# Patient Record
Sex: MALE | Race: AMERICAN INDIAN OR ALASKA NATIVE | NOT HISPANIC OR LATINO
[De-identification: names, ages, dates, MRNs, and addresses within clinical notes are randomized per-mention and may not be internally consistent; named-entity substitution may affect disease eponyms.]

---

## 2017-01-17 ENCOUNTER — RX RENEWAL (OUTPATIENT)
Age: 52
End: 2017-01-17

## 2017-01-18 ENCOUNTER — RX RENEWAL (OUTPATIENT)
Age: 52
End: 2017-01-18

## 2017-02-02 ENCOUNTER — APPOINTMENT (OUTPATIENT)
Dept: INFECTIOUS DISEASE | Facility: CLINIC | Age: 52
End: 2017-02-02

## 2017-02-13 ENCOUNTER — RX RENEWAL (OUTPATIENT)
Age: 52
End: 2017-02-13

## 2017-02-15 ENCOUNTER — MEDICATION RENEWAL (OUTPATIENT)
Age: 52
End: 2017-02-15

## 2017-02-15 ENCOUNTER — RX RENEWAL (OUTPATIENT)
Age: 52
End: 2017-02-15

## 2017-02-16 ENCOUNTER — RX RENEWAL (OUTPATIENT)
Age: 52
End: 2017-02-16

## 2017-03-08 ENCOUNTER — OUTPATIENT (OUTPATIENT)
Dept: OUTPATIENT SERVICES | Facility: HOSPITAL | Age: 52
LOS: 1 days | Discharge: HOME | End: 2017-03-08

## 2017-03-08 DIAGNOSIS — M79.605 PAIN IN LEFT LEG: ICD-10-CM

## 2017-03-09 ENCOUNTER — FORM ENCOUNTER (OUTPATIENT)
Age: 52
End: 2017-03-09

## 2017-03-10 ENCOUNTER — APPOINTMENT (OUTPATIENT)
Dept: RADIOLOGY | Facility: IMAGING CENTER | Age: 52
End: 2017-03-10

## 2017-03-10 ENCOUNTER — OUTPATIENT (OUTPATIENT)
Dept: OUTPATIENT SERVICES | Facility: HOSPITAL | Age: 52
LOS: 1 days | End: 2017-03-10
Payer: MEDICARE

## 2017-03-10 ENCOUNTER — APPOINTMENT (OUTPATIENT)
Dept: HEMATOLOGY ONCOLOGY | Facility: CLINIC | Age: 52
End: 2017-03-10

## 2017-03-10 VITALS
RESPIRATION RATE: 16 BRPM | SYSTOLIC BLOOD PRESSURE: 143 MMHG | DIASTOLIC BLOOD PRESSURE: 87 MMHG | WEIGHT: 287.7 LBS | OXYGEN SATURATION: 95 % | BODY MASS INDEX: 36.94 KG/M2 | TEMPERATURE: 97.7 F | HEART RATE: 100 BPM

## 2017-03-10 DIAGNOSIS — M79.2 NEURALGIA AND NEURITIS, UNSPECIFIED: ICD-10-CM

## 2017-03-10 PROCEDURE — 73610 X-RAY EXAM OF ANKLE: CPT

## 2017-04-11 ENCOUNTER — RX RENEWAL (OUTPATIENT)
Age: 52
End: 2017-04-11

## 2017-04-13 ENCOUNTER — MEDICATION RENEWAL (OUTPATIENT)
Age: 52
End: 2017-04-13

## 2017-05-10 ENCOUNTER — RX RENEWAL (OUTPATIENT)
Age: 52
End: 2017-05-10

## 2017-06-14 ENCOUNTER — RX RENEWAL (OUTPATIENT)
Age: 52
End: 2017-06-14

## 2017-07-11 ENCOUNTER — RX RENEWAL (OUTPATIENT)
Age: 52
End: 2017-07-11

## 2017-08-02 ENCOUNTER — OUTPATIENT (OUTPATIENT)
Dept: OUTPATIENT SERVICES | Facility: HOSPITAL | Age: 52
LOS: 1 days | Discharge: HOME | End: 2017-08-02

## 2017-08-02 DIAGNOSIS — M79.609 PAIN IN UNSPECIFIED LIMB: ICD-10-CM

## 2017-08-07 ENCOUNTER — APPOINTMENT (OUTPATIENT)
Dept: HEMATOLOGY ONCOLOGY | Facility: CLINIC | Age: 52
End: 2017-08-07

## 2017-08-07 ENCOUNTER — RX RENEWAL (OUTPATIENT)
Age: 52
End: 2017-08-07

## 2017-08-07 ENCOUNTER — APPOINTMENT (OUTPATIENT)
Dept: HEMATOLOGY ONCOLOGY | Facility: CLINIC | Age: 52
End: 2017-08-07
Payer: MEDICARE

## 2017-08-07 VITALS
DIASTOLIC BLOOD PRESSURE: 70 MMHG | WEIGHT: 284.4 LBS | RESPIRATION RATE: 16 BRPM | HEART RATE: 110 BPM | OXYGEN SATURATION: 99 % | TEMPERATURE: 98 F | BODY MASS INDEX: 36.51 KG/M2 | SYSTOLIC BLOOD PRESSURE: 140 MMHG

## 2017-08-07 DIAGNOSIS — J06.9 ACUTE UPPER RESPIRATORY INFECTION, UNSPECIFIED: ICD-10-CM

## 2017-08-07 DIAGNOSIS — F31.9 BIPOLAR DISORDER, UNSPECIFIED: ICD-10-CM

## 2017-08-07 PROCEDURE — 99215 OFFICE O/P EST HI 40 MIN: CPT

## 2017-08-08 ENCOUNTER — RX RENEWAL (OUTPATIENT)
Age: 52
End: 2017-08-08

## 2017-09-05 ENCOUNTER — MEDICATION RENEWAL (OUTPATIENT)
Age: 52
End: 2017-09-05

## 2017-09-07 ENCOUNTER — RX RENEWAL (OUTPATIENT)
Age: 52
End: 2017-09-07

## 2017-11-02 ENCOUNTER — RX RENEWAL (OUTPATIENT)
Age: 52
End: 2017-11-02

## 2017-11-09 ENCOUNTER — RX RENEWAL (OUTPATIENT)
Age: 52
End: 2017-11-09

## 2017-11-17 ENCOUNTER — OUTPATIENT (OUTPATIENT)
Dept: OUTPATIENT SERVICES | Facility: HOSPITAL | Age: 52
LOS: 1 days | Discharge: HOME | End: 2017-11-17

## 2017-11-17 DIAGNOSIS — M79.609 PAIN IN UNSPECIFIED LIMB: ICD-10-CM

## 2017-12-01 ENCOUNTER — OUTPATIENT (OUTPATIENT)
Dept: OUTPATIENT SERVICES | Facility: HOSPITAL | Age: 52
LOS: 1 days | End: 2017-12-01
Payer: MEDICAID

## 2017-12-01 PROCEDURE — G9001: CPT

## 2017-12-06 ENCOUNTER — RX RENEWAL (OUTPATIENT)
Age: 52
End: 2017-12-06

## 2017-12-13 ENCOUNTER — EMERGENCY (EMERGENCY)
Facility: HOSPITAL | Age: 52
LOS: 1 days | Discharge: HOME | End: 2017-12-13
Attending: EMERGENCY MEDICINE | Admitting: EMERGENCY MEDICINE
Payer: MEDICARE

## 2017-12-13 VITALS
SYSTOLIC BLOOD PRESSURE: 153 MMHG | RESPIRATION RATE: 16 BRPM | TEMPERATURE: 98 F | WEIGHT: 281.97 LBS | DIASTOLIC BLOOD PRESSURE: 88 MMHG | HEART RATE: 100 BPM | OXYGEN SATURATION: 94 % | HEIGHT: 74 IN

## 2017-12-13 VITALS
HEART RATE: 80 BPM | DIASTOLIC BLOOD PRESSURE: 87 MMHG | RESPIRATION RATE: 16 BRPM | OXYGEN SATURATION: 98 % | SYSTOLIC BLOOD PRESSURE: 130 MMHG | TEMPERATURE: 98 F

## 2017-12-13 LAB
ALBUMIN SERPL ELPH-MCNC: 3.2 G/DL — LOW (ref 3.3–5)
ALP SERPL-CCNC: 42 U/L — SIGNIFICANT CHANGE UP (ref 40–120)
ALT FLD-CCNC: 17 U/L — SIGNIFICANT CHANGE UP (ref 12–78)
ANION GAP SERPL CALC-SCNC: 7 MMOL/L — SIGNIFICANT CHANGE UP (ref 5–17)
APTT BLD: 34.4 SEC — SIGNIFICANT CHANGE UP (ref 27.5–37.4)
AST SERPL-CCNC: 25 U/L — SIGNIFICANT CHANGE UP (ref 15–37)
BASOPHILS # BLD AUTO: 0.1 K/UL — SIGNIFICANT CHANGE UP (ref 0–0.2)
BASOPHILS NFR BLD AUTO: 1.7 % — SIGNIFICANT CHANGE UP (ref 0–2)
BILIRUB SERPL-MCNC: 0.3 MG/DL — SIGNIFICANT CHANGE UP (ref 0.2–1.2)
BUN SERPL-MCNC: 15 MG/DL — SIGNIFICANT CHANGE UP (ref 7–23)
CALCIUM SERPL-MCNC: 8.3 MG/DL — LOW (ref 8.5–10.1)
CHLORIDE SERPL-SCNC: 105 MMOL/L — SIGNIFICANT CHANGE UP (ref 96–108)
CK MB BLD-MCNC: 2.1 % — SIGNIFICANT CHANGE UP (ref 0–3.5)
CK MB CFR SERPL CALC: 1.3 NG/ML — SIGNIFICANT CHANGE UP (ref 0–3.6)
CK SERPL-CCNC: 63 U/L — SIGNIFICANT CHANGE UP (ref 26–308)
CO2 SERPL-SCNC: 26 MMOL/L — SIGNIFICANT CHANGE UP (ref 22–31)
CREAT SERPL-MCNC: 1.3 MG/DL — SIGNIFICANT CHANGE UP (ref 0.5–1.3)
EOSINOPHIL # BLD AUTO: 0.2 K/UL — SIGNIFICANT CHANGE UP (ref 0–0.5)
EOSINOPHIL NFR BLD AUTO: 3.5 % — SIGNIFICANT CHANGE UP (ref 0–6)
GLUCOSE SERPL-MCNC: 172 MG/DL — HIGH (ref 70–99)
HCT VFR BLD CALC: 43.4 % — SIGNIFICANT CHANGE UP (ref 39–50)
HGB BLD-MCNC: 12.8 G/DL — LOW (ref 13–17)
INR BLD: 1.07 RATIO — SIGNIFICANT CHANGE UP (ref 0.88–1.16)
LACTATE SERPL-SCNC: 1.3 MMOL/L — SIGNIFICANT CHANGE UP (ref 0.7–2)
LYMPHOCYTES # BLD AUTO: 2 K/UL — SIGNIFICANT CHANGE UP (ref 1–3.3)
LYMPHOCYTES # BLD AUTO: 34 % — SIGNIFICANT CHANGE UP (ref 13–44)
MCHC RBC-ENTMCNC: 23.1 PG — LOW (ref 27–34)
MCHC RBC-ENTMCNC: 29.6 GM/DL — LOW (ref 32–36)
MCV RBC AUTO: 78 FL — LOW (ref 80–100)
MONOCYTES # BLD AUTO: 0.8 K/UL — SIGNIFICANT CHANGE UP (ref 0–0.9)
MONOCYTES NFR BLD AUTO: 13.1 % — HIGH (ref 1–9)
NEUTROPHILS # BLD AUTO: 2.8 K/UL — SIGNIFICANT CHANGE UP (ref 1.8–7.4)
NEUTROPHILS NFR BLD AUTO: 47.7 % — SIGNIFICANT CHANGE UP (ref 43–77)
PLATELET # BLD AUTO: 306 K/UL — SIGNIFICANT CHANGE UP (ref 150–400)
POTASSIUM SERPL-MCNC: 3.9 MMOL/L — SIGNIFICANT CHANGE UP (ref 3.5–5.3)
POTASSIUM SERPL-SCNC: 3.9 MMOL/L — SIGNIFICANT CHANGE UP (ref 3.5–5.3)
PROT SERPL-MCNC: 7.7 G/DL — SIGNIFICANT CHANGE UP (ref 6–8.3)
PROTHROM AB SERPL-ACNC: 11.7 SEC — SIGNIFICANT CHANGE UP (ref 9.8–12.7)
RBC # BLD: 5.56 M/UL — SIGNIFICANT CHANGE UP (ref 4.2–5.8)
RBC # FLD: 18.1 % — HIGH (ref 10.3–14.5)
SODIUM SERPL-SCNC: 138 MMOL/L — SIGNIFICANT CHANGE UP (ref 135–145)
TROPONIN I SERPL-MCNC: <.015 NG/ML — SIGNIFICANT CHANGE UP (ref 0.01–0.04)
WBC # BLD: 5.8 K/UL — SIGNIFICANT CHANGE UP (ref 3.8–10.5)
WBC # FLD AUTO: 5.8 K/UL — SIGNIFICANT CHANGE UP (ref 3.8–10.5)

## 2017-12-13 PROCEDURE — 82553 CREATINE MB FRACTION: CPT

## 2017-12-13 PROCEDURE — 99284 EMERGENCY DEPT VISIT MOD MDM: CPT | Mod: 25

## 2017-12-13 PROCEDURE — 99285 EMERGENCY DEPT VISIT HI MDM: CPT

## 2017-12-13 PROCEDURE — 84484 ASSAY OF TROPONIN QUANT: CPT

## 2017-12-13 PROCEDURE — 71260 CT THORAX DX C+: CPT

## 2017-12-13 PROCEDURE — 82550 ASSAY OF CK (CPK): CPT

## 2017-12-13 PROCEDURE — 83605 ASSAY OF LACTIC ACID: CPT

## 2017-12-13 PROCEDURE — 80053 COMPREHEN METABOLIC PANEL: CPT

## 2017-12-13 PROCEDURE — 93005 ELECTROCARDIOGRAM TRACING: CPT

## 2017-12-13 PROCEDURE — 85610 PROTHROMBIN TIME: CPT

## 2017-12-13 PROCEDURE — 93010 ELECTROCARDIOGRAM REPORT: CPT

## 2017-12-13 PROCEDURE — 85730 THROMBOPLASTIN TIME PARTIAL: CPT

## 2017-12-13 PROCEDURE — 96365 THER/PROPH/DIAG IV INF INIT: CPT | Mod: XU

## 2017-12-13 PROCEDURE — 71020: CPT | Mod: 26

## 2017-12-13 PROCEDURE — 71046 X-RAY EXAM CHEST 2 VIEWS: CPT

## 2017-12-13 PROCEDURE — 71260 CT THORAX DX C+: CPT | Mod: 26

## 2017-12-13 PROCEDURE — 87040 BLOOD CULTURE FOR BACTERIA: CPT

## 2017-12-13 PROCEDURE — 85027 COMPLETE CBC AUTOMATED: CPT

## 2017-12-13 RX ORDER — OXYCODONE HYDROCHLORIDE 5 MG/1
0 TABLET ORAL
Qty: 0 | Refills: 0 | COMMUNITY

## 2017-12-13 RX ORDER — SODIUM CHLORIDE 9 MG/ML
3 INJECTION INTRAMUSCULAR; INTRAVENOUS; SUBCUTANEOUS ONCE
Qty: 0 | Refills: 0 | Status: COMPLETED | OUTPATIENT
Start: 2017-12-13 | End: 2017-12-13

## 2017-12-13 RX ORDER — METFORMIN HYDROCHLORIDE 850 MG/1
0 TABLET ORAL
Qty: 0 | Refills: 0 | COMMUNITY

## 2017-12-13 RX ORDER — TAMSULOSIN HYDROCHLORIDE 0.4 MG/1
1 CAPSULE ORAL
Qty: 0 | Refills: 0 | COMMUNITY

## 2017-12-13 RX ORDER — OMEGA-3 ACID ETHYL ESTERS 1 G
2 CAPSULE ORAL
Qty: 0 | Refills: 0 | COMMUNITY

## 2017-12-13 RX ORDER — SODIUM CHLORIDE 9 MG/ML
1000 INJECTION INTRAMUSCULAR; INTRAVENOUS; SUBCUTANEOUS ONCE
Qty: 0 | Refills: 0 | Status: COMPLETED | OUTPATIENT
Start: 2017-12-13 | End: 2017-12-13

## 2017-12-13 RX ORDER — PROPRANOLOL HCL 160 MG
0 CAPSULE, EXTENDED RELEASE 24HR ORAL
Qty: 0 | Refills: 0 | COMMUNITY

## 2017-12-13 RX ORDER — AZITHROMYCIN 500 MG/1
500 TABLET, FILM COATED ORAL ONCE
Qty: 0 | Refills: 0 | Status: DISCONTINUED | OUTPATIENT
Start: 2017-12-13 | End: 2017-12-17

## 2017-12-13 RX ORDER — ALPRAZOLAM 0.25 MG
0 TABLET ORAL
Qty: 0 | Refills: 0 | COMMUNITY

## 2017-12-13 RX ORDER — METHADONE HYDROCHLORIDE 40 MG/1
0 TABLET ORAL
Qty: 0 | Refills: 0 | COMMUNITY

## 2017-12-13 RX ORDER — FLUOXETINE HCL 10 MG
0 CAPSULE ORAL
Qty: 0 | Refills: 0 | COMMUNITY

## 2017-12-13 RX ORDER — ATORVASTATIN CALCIUM 80 MG/1
0 TABLET, FILM COATED ORAL
Qty: 0 | Refills: 0 | COMMUNITY

## 2017-12-13 RX ORDER — LIRAGLUTIDE 6 MG/ML
0 INJECTION SUBCUTANEOUS
Qty: 0 | Refills: 0 | COMMUNITY

## 2017-12-13 RX ORDER — CEFUROXIME AXETIL 250 MG
1 TABLET ORAL
Qty: 14 | Refills: 0 | OUTPATIENT
Start: 2017-12-13 | End: 2017-12-19

## 2017-12-13 RX ORDER — AZITHROMYCIN 500 MG/1
1 TABLET, FILM COATED ORAL
Qty: 4 | Refills: 0 | OUTPATIENT
Start: 2017-12-13 | End: 2017-12-16

## 2017-12-13 RX ORDER — ESOMEPRAZOLE MAGNESIUM 40 MG/1
0 CAPSULE, DELAYED RELEASE ORAL
Qty: 0 | Refills: 0 | COMMUNITY

## 2017-12-13 RX ORDER — DEXTROAMPHETAMINE SACCHARATE, AMPHETAMINE ASPARTATE, DEXTROAMPHETAMINE SULFATE AND AMPHETAMINE SULFATE 1.875; 1.875; 1.875; 1.875 MG/1; MG/1; MG/1; MG/1
0 TABLET ORAL
Qty: 0 | Refills: 0 | COMMUNITY

## 2017-12-13 RX ORDER — CEFTRIAXONE 500 MG/1
1 INJECTION, POWDER, FOR SOLUTION INTRAMUSCULAR; INTRAVENOUS ONCE
Qty: 0 | Refills: 0 | Status: COMPLETED | OUTPATIENT
Start: 2017-12-13 | End: 2017-12-13

## 2017-12-13 RX ADMIN — SODIUM CHLORIDE 3 MILLILITER(S): 9 INJECTION INTRAMUSCULAR; INTRAVENOUS; SUBCUTANEOUS at 14:45

## 2017-12-13 RX ADMIN — SODIUM CHLORIDE 1000 MILLILITER(S): 9 INJECTION INTRAMUSCULAR; INTRAVENOUS; SUBCUTANEOUS at 15:01

## 2017-12-13 RX ADMIN — CEFTRIAXONE 100 GRAM(S): 500 INJECTION, POWDER, FOR SOLUTION INTRAMUSCULAR; INTRAVENOUS at 15:46

## 2017-12-13 NOTE — ED ADULT TRIAGE NOTE - CHIEF COMPLAINT QUOTE
pt sent by dr [facility] for cough, congestion, shortness of breath dizziness. pt also stating left sided CP

## 2017-12-13 NOTE — ED ADULT NURSE NOTE - PMH
DM (diabetes mellitus)    HIV (human immunodeficiency virus infection)    Neuropathy    PNA (pneumonia)    Sleep apnea

## 2017-12-13 NOTE — ED PROVIDER NOTE - PHYSICAL EXAMINATION
Gen:  alert, awake, no acute distress  HEENT:  atraumatic head, airway clear, pupils equal and round  CV:  rrr, nl S1, S2, no m/r/g  Pulm:  BS equal b/l  Abd: s/nt/nd, +BS  Ext:  moving all extremities  Neuro:  grossly intact, no deficits  Skin:  clear, dry, intact

## 2017-12-13 NOTE — ED PROVIDER NOTE - OBJECTIVE STATEMENT
cough, congestion, cp and sob for 2-3 days, seen at urgent care and sent to ER for "abnormal" cxr and ekg.  pt w h/o afib in the past.  cp described and sharp, intermittent, no pain now. pt denies fever, + non-productive cough

## 2017-12-13 NOTE — ED ADULT NURSE NOTE - OBJECTIVE STATEMENT
pt to er c/o chest discomfort pain radiating to back is alert oriented speech clear lungs clear resp even unlabored iv started 20 angio left hand labs drawn blood cultures drawn

## 2017-12-14 ENCOUNTER — APPOINTMENT (OUTPATIENT)
Dept: HEMATOLOGY ONCOLOGY | Facility: CLINIC | Age: 52
End: 2017-12-14

## 2017-12-15 DIAGNOSIS — R69 ILLNESS, UNSPECIFIED: ICD-10-CM

## 2017-12-18 LAB
CULTURE RESULTS: SIGNIFICANT CHANGE UP
CULTURE RESULTS: SIGNIFICANT CHANGE UP
SPECIMEN SOURCE: SIGNIFICANT CHANGE UP
SPECIMEN SOURCE: SIGNIFICANT CHANGE UP

## 2018-01-03 ENCOUNTER — RX RENEWAL (OUTPATIENT)
Age: 53
End: 2018-01-03

## 2018-01-09 ENCOUNTER — OUTPATIENT (OUTPATIENT)
Dept: OUTPATIENT SERVICES | Facility: HOSPITAL | Age: 53
LOS: 1 days | Discharge: HOME | End: 2018-01-09

## 2018-01-09 DIAGNOSIS — M79.609 PAIN IN UNSPECIFIED LIMB: ICD-10-CM

## 2018-01-22 ENCOUNTER — APPOINTMENT (OUTPATIENT)
Dept: HEMATOLOGY ONCOLOGY | Facility: CLINIC | Age: 53
End: 2018-01-22
Payer: MEDICARE

## 2018-01-22 VITALS
TEMPERATURE: 98.9 F | BODY MASS INDEX: 34.82 KG/M2 | WEIGHT: 271.17 LBS | DIASTOLIC BLOOD PRESSURE: 93 MMHG | HEART RATE: 86 BPM | OXYGEN SATURATION: 94 % | RESPIRATION RATE: 16 BRPM | SYSTOLIC BLOOD PRESSURE: 153 MMHG

## 2018-01-22 DIAGNOSIS — R19.7 DIARRHEA, UNSPECIFIED: ICD-10-CM

## 2018-01-22 PROCEDURE — 99215 OFFICE O/P EST HI 40 MIN: CPT

## 2018-01-26 ENCOUNTER — RX RENEWAL (OUTPATIENT)
Age: 53
End: 2018-01-26

## 2018-02-01 ENCOUNTER — RX RENEWAL (OUTPATIENT)
Age: 53
End: 2018-02-01

## 2018-02-27 ENCOUNTER — RX RENEWAL (OUTPATIENT)
Age: 53
End: 2018-02-27

## 2018-03-01 ENCOUNTER — RX RENEWAL (OUTPATIENT)
Age: 53
End: 2018-03-01

## 2018-03-20 ENCOUNTER — OUTPATIENT (OUTPATIENT)
Dept: OUTPATIENT SERVICES | Facility: HOSPITAL | Age: 53
LOS: 1 days | Discharge: HOME | End: 2018-03-20

## 2018-03-20 DIAGNOSIS — M79.609 PAIN IN UNSPECIFIED LIMB: ICD-10-CM

## 2018-03-21 ENCOUNTER — RX RENEWAL (OUTPATIENT)
Age: 53
End: 2018-03-21

## 2018-04-01 ENCOUNTER — OUTPATIENT (OUTPATIENT)
Dept: OUTPATIENT SERVICES | Facility: HOSPITAL | Age: 53
LOS: 1 days | End: 2018-04-01
Payer: MEDICAID

## 2018-04-01 PROCEDURE — G9001: CPT

## 2018-04-05 DIAGNOSIS — R69 ILLNESS, UNSPECIFIED: ICD-10-CM

## 2018-04-06 NOTE — ED PROVIDER NOTE - PROGRESS NOTE DETAILS
Pt doing well, no acute co or changes. Pt on HIV meds, last test undetectable.   Dw pt re labs /.XR / CT findings, need for close, prompt fu. Also discussed chronic kidney findings, no acute co / urinary sx / l back / flank pain although he has had in past. He will fu with urology asap.  WIll rx with abx. Dw them re infxn prec / inst and importance of close, prompt outpt fu
Applied

## 2018-04-25 ENCOUNTER — RX RENEWAL (OUTPATIENT)
Age: 53
End: 2018-04-25

## 2018-05-15 ENCOUNTER — OUTPATIENT (OUTPATIENT)
Dept: OUTPATIENT SERVICES | Facility: HOSPITAL | Age: 53
LOS: 1 days | Discharge: HOME | End: 2018-05-15

## 2018-05-15 DIAGNOSIS — M79.609 PAIN IN UNSPECIFIED LIMB: ICD-10-CM

## 2018-05-17 ENCOUNTER — APPOINTMENT (OUTPATIENT)
Dept: HEMATOLOGY ONCOLOGY | Facility: CLINIC | Age: 53
End: 2018-05-17
Payer: MEDICAID

## 2018-05-17 VITALS
BODY MASS INDEX: 34.82 KG/M2 | OXYGEN SATURATION: 94 % | HEART RATE: 127 BPM | RESPIRATION RATE: 18 BRPM | DIASTOLIC BLOOD PRESSURE: 80 MMHG | TEMPERATURE: 98.8 F | SYSTOLIC BLOOD PRESSURE: 142 MMHG | WEIGHT: 271.17 LBS

## 2018-05-17 PROCEDURE — 99214 OFFICE O/P EST MOD 30 MIN: CPT

## 2018-05-23 ENCOUNTER — RX RENEWAL (OUTPATIENT)
Age: 53
End: 2018-05-23

## 2018-06-18 ENCOUNTER — RX RENEWAL (OUTPATIENT)
Age: 53
End: 2018-06-18

## 2018-06-25 ENCOUNTER — OUTPATIENT (OUTPATIENT)
Dept: OUTPATIENT SERVICES | Facility: HOSPITAL | Age: 53
LOS: 1 days | Discharge: HOME | End: 2018-06-25

## 2018-06-25 DIAGNOSIS — M79.609 PAIN IN UNSPECIFIED LIMB: ICD-10-CM

## 2018-07-05 ENCOUNTER — APPOINTMENT (OUTPATIENT)
Dept: HEMATOLOGY ONCOLOGY | Facility: CLINIC | Age: 53
End: 2018-07-05
Payer: MEDICARE

## 2018-07-05 VITALS
BODY MASS INDEX: 35.72 KG/M2 | RESPIRATION RATE: 18 BRPM | TEMPERATURE: 98.7 F | HEART RATE: 94 BPM | SYSTOLIC BLOOD PRESSURE: 146 MMHG | WEIGHT: 278.22 LBS | DIASTOLIC BLOOD PRESSURE: 79 MMHG | OXYGEN SATURATION: 94 %

## 2018-07-05 PROCEDURE — 99214 OFFICE O/P EST MOD 30 MIN: CPT

## 2018-07-17 ENCOUNTER — RX RENEWAL (OUTPATIENT)
Age: 53
End: 2018-07-17

## 2018-08-14 ENCOUNTER — RX RENEWAL (OUTPATIENT)
Age: 53
End: 2018-08-14

## 2018-08-15 ENCOUNTER — RX RENEWAL (OUTPATIENT)
Age: 53
End: 2018-08-15

## 2018-08-17 ENCOUNTER — MEDICATION RENEWAL (OUTPATIENT)
Age: 53
End: 2018-08-17

## 2018-09-04 PROBLEM — G62.9 POLYNEUROPATHY, UNSPECIFIED: Chronic | Status: ACTIVE | Noted: 2017-12-13

## 2018-09-04 PROBLEM — G47.30 SLEEP APNEA, UNSPECIFIED: Chronic | Status: ACTIVE | Noted: 2017-12-13

## 2018-09-04 PROBLEM — J18.9 PNEUMONIA, UNSPECIFIED ORGANISM: Chronic | Status: ACTIVE | Noted: 2017-12-13

## 2018-09-04 PROBLEM — B20 HUMAN IMMUNODEFICIENCY VIRUS [HIV] DISEASE: Chronic | Status: ACTIVE | Noted: 2017-12-13

## 2018-09-04 PROBLEM — E11.9 TYPE 2 DIABETES MELLITUS WITHOUT COMPLICATIONS: Chronic | Status: ACTIVE | Noted: 2017-12-13

## 2018-09-05 ENCOUNTER — OUTPATIENT (OUTPATIENT)
Dept: OUTPATIENT SERVICES | Facility: HOSPITAL | Age: 53
LOS: 1 days | Discharge: HOME | End: 2018-09-05

## 2018-09-05 DIAGNOSIS — M79.606 PAIN IN LEG, UNSPECIFIED: ICD-10-CM

## 2018-09-10 ENCOUNTER — LABORATORY RESULT (OUTPATIENT)
Age: 53
End: 2018-09-10

## 2018-09-10 ENCOUNTER — APPOINTMENT (OUTPATIENT)
Dept: HEMATOLOGY ONCOLOGY | Facility: CLINIC | Age: 53
End: 2018-09-10
Payer: MEDICARE

## 2018-09-10 VITALS
DIASTOLIC BLOOD PRESSURE: 86 MMHG | RESPIRATION RATE: 18 BRPM | SYSTOLIC BLOOD PRESSURE: 120 MMHG | OXYGEN SATURATION: 99 % | HEART RATE: 122 BPM | WEIGHT: 274.48 LBS | TEMPERATURE: 98.1 F | BODY MASS INDEX: 35.24 KG/M2

## 2018-09-10 PROCEDURE — 99214 OFFICE O/P EST MOD 30 MIN: CPT

## 2018-10-11 ENCOUNTER — RX RENEWAL (OUTPATIENT)
Age: 53
End: 2018-10-11

## 2018-11-06 ENCOUNTER — RX RENEWAL (OUTPATIENT)
Age: 53
End: 2018-11-06

## 2018-12-04 ENCOUNTER — RX RENEWAL (OUTPATIENT)
Age: 53
End: 2018-12-04

## 2018-12-16 ENCOUNTER — OUTPATIENT (OUTPATIENT)
Dept: OUTPATIENT SERVICES | Facility: HOSPITAL | Age: 53
LOS: 1 days | Discharge: HOME | End: 2018-12-16

## 2018-12-16 DIAGNOSIS — M79.609 PAIN IN UNSPECIFIED LIMB: ICD-10-CM

## 2018-12-27 ENCOUNTER — APPOINTMENT (OUTPATIENT)
Dept: HEMATOLOGY ONCOLOGY | Facility: CLINIC | Age: 53
End: 2018-12-27
Payer: MEDICARE

## 2018-12-27 VITALS
HEART RATE: 93 BPM | RESPIRATION RATE: 16 BRPM | WEIGHT: 262 LBS | BODY MASS INDEX: 33.64 KG/M2 | SYSTOLIC BLOOD PRESSURE: 167 MMHG | TEMPERATURE: 98.4 F | DIASTOLIC BLOOD PRESSURE: 70 MMHG | OXYGEN SATURATION: 94 %

## 2018-12-27 PROCEDURE — 99215 OFFICE O/P EST HI 40 MIN: CPT

## 2018-12-31 NOTE — HISTORY OF PRESENT ILLNESS
[de-identified] : Compa is a 50-year-old man with a history of HIV disease currently under control with antiviral therapy. He is been seen here for many years with regards to management of neuropathic pain which he suffers from moderate ongoing basis. The patient has been on significant amounts of opiate-based therapies over the years and had been on higher doses of methadone to help control the neuropathic pain. However, as a result of sleep apnea issues, this is been considerably lowered from a high of approximately 150 mg down to 40 mg. This is resulted in improvement in his symptoms as well as CPAP therapy at home.the patient is also a diabetic, and in addition,. the patient uses high doses of oxycodone as well to control pain in his legs feet and lower back. He also suffers from severe depression and last year it was essentially in his bedroom from January until June as a result of a breakup with his partner. The patient is [place]  and has had trouble with sustaining relationships over the years.  [de-identified] : ablation for fib/ flutter sept 25, had fallen several times,feeling much better now.More motivated to cont a weaning process re his opioid dependency issues.

## 2018-12-31 NOTE — REVIEW OF SYSTEMS
[Joint Pain] : joint pain [Joint Stiffness] : joint stiffness [Muscle Pain] : muscle pain [Muscle Weakness] : muscle weakness [Anxiety] : anxiety [Depression] : depression [Negative] : Allergic/Immunologic

## 2018-12-31 NOTE — ASSESSMENT
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 and polypharmacy issues.Psychiatric overlays as prev documented.Recent PAF w abaltion.Additional weaning at 30 more pills per month beginning next Rx cycle.Pt agrees [Supportive] : Goals of care discussed with patient: Supportive

## 2019-01-02 ENCOUNTER — RX RENEWAL (OUTPATIENT)
Age: 54
End: 2019-01-02

## 2019-01-28 ENCOUNTER — RX RENEWAL (OUTPATIENT)
Age: 54
End: 2019-01-28

## 2019-02-27 ENCOUNTER — RX RENEWAL (OUTPATIENT)
Age: 54
End: 2019-02-27

## 2019-04-25 ENCOUNTER — RX RENEWAL (OUTPATIENT)
Age: 54
End: 2019-04-25

## 2019-05-07 ENCOUNTER — OUTPATIENT (OUTPATIENT)
Dept: OUTPATIENT SERVICES | Facility: HOSPITAL | Age: 54
LOS: 1 days | Discharge: HOME | End: 2019-05-07

## 2019-05-07 DIAGNOSIS — M79.609 PAIN IN UNSPECIFIED LIMB: ICD-10-CM

## 2019-05-09 ENCOUNTER — APPOINTMENT (OUTPATIENT)
Dept: HEMATOLOGY ONCOLOGY | Facility: CLINIC | Age: 54
End: 2019-05-09
Payer: MEDICARE

## 2019-05-09 VITALS
WEIGHT: 261.01 LBS | RESPIRATION RATE: 16 BRPM | BODY MASS INDEX: 33.51 KG/M2 | HEART RATE: 82 BPM | SYSTOLIC BLOOD PRESSURE: 136 MMHG | DIASTOLIC BLOOD PRESSURE: 80 MMHG | OXYGEN SATURATION: 93 % | TEMPERATURE: 98.2 F

## 2019-05-09 PROCEDURE — 99214 OFFICE O/P EST MOD 30 MIN: CPT

## 2019-05-13 NOTE — ASSESSMENT
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 and polypharmacy issues.Psychiatric overlays as prev documented. Additional weaning at 30 more pills per month beginning next Rx cycle.Pt agrees [Supportive] : Goals of care discussed with patient: Supportive

## 2019-05-13 NOTE — REVIEW OF SYSTEMS
[Joint Pain] : joint pain [Joint Stiffness] : joint stiffness [Muscle Weakness] : muscle weakness [Muscle Pain] : muscle pain [Anxiety] : anxiety [Depression] : depression [Negative] : Allergic/Immunologic

## 2019-05-13 NOTE — HISTORY OF PRESENT ILLNESS
[de-identified] : Compa is a 50-year-old man with a history of HIV disease currently under control with antiviral therapy. He is been seen here for many years with regards to management of neuropathic pain which he suffers from moderate ongoing basis. The patient has been on significant amounts of opiate-based therapies over the years and had been on higher doses of methadone to help control the neuropathic pain. However, as a result of sleep apnea issues, this is been considerably lowered from a high of approximately 150 mg down to 40 mg. This is resulted in improvement in his symptoms as well as CPAP therapy at home.the patient is also a diabetic, and in addition,. the patient uses high doses of oxycodone as well to control pain in his legs feet and lower back. He also suffers from severe depression and last year it was essentially in his bedroom from January until June as a result of a breakup with his partner. The patient is [place]  and has had trouble with sustaining relationships over the years.  [de-identified] : Ongoing dialogue re slow wean.Had been rx'd a higher abdifatah this month w expectation pt would conserve them.He states he had more pain and used "extra".This was deemed as unacceptable and he was counseled.This month to make additional effort at saving 1 pill per day and returning w saved pills in a pill bottle.Pt agrees.

## 2019-05-22 ENCOUNTER — RX RENEWAL (OUTPATIENT)
Age: 54
End: 2019-05-22

## 2019-06-19 ENCOUNTER — RX RENEWAL (OUTPATIENT)
Age: 54
End: 2019-06-19

## 2019-07-18 ENCOUNTER — RX RENEWAL (OUTPATIENT)
Age: 54
End: 2019-07-18

## 2019-08-09 ENCOUNTER — RX RENEWAL (OUTPATIENT)
Age: 54
End: 2019-08-09

## 2019-08-22 ENCOUNTER — OUTPATIENT (OUTPATIENT)
Dept: OUTPATIENT SERVICES | Facility: HOSPITAL | Age: 54
LOS: 1 days | Discharge: HOME | End: 2019-08-22

## 2019-08-22 DIAGNOSIS — M79.609 PAIN IN UNSPECIFIED LIMB: ICD-10-CM

## 2019-08-26 ENCOUNTER — APPOINTMENT (OUTPATIENT)
Dept: HEMATOLOGY ONCOLOGY | Facility: CLINIC | Age: 54
End: 2019-08-26
Payer: MEDICARE

## 2019-08-26 VITALS
BODY MASS INDEX: 30 KG/M2 | OXYGEN SATURATION: 99 % | HEART RATE: 87 BPM | SYSTOLIC BLOOD PRESSURE: 153 MMHG | DIASTOLIC BLOOD PRESSURE: 93 MMHG | RESPIRATION RATE: 14 BRPM | WEIGHT: 233.69 LBS | TEMPERATURE: 98.7 F

## 2019-08-26 PROCEDURE — 99215 OFFICE O/P EST HI 40 MIN: CPT

## 2019-09-12 ENCOUNTER — RX RENEWAL (OUTPATIENT)
Age: 54
End: 2019-09-12

## 2019-09-13 ENCOUNTER — RX RENEWAL (OUTPATIENT)
Age: 54
End: 2019-09-13

## 2019-10-07 ENCOUNTER — RX RENEWAL (OUTPATIENT)
Age: 54
End: 2019-10-07

## 2019-11-06 ENCOUNTER — RX RENEWAL (OUTPATIENT)
Age: 54
End: 2019-11-06

## 2019-11-29 ENCOUNTER — OUTPATIENT (OUTPATIENT)
Dept: OUTPATIENT SERVICES | Facility: HOSPITAL | Age: 54
LOS: 1 days | Discharge: HOME | End: 2019-11-29

## 2019-11-29 DIAGNOSIS — M79.609 PAIN IN UNSPECIFIED LIMB: ICD-10-CM

## 2019-12-05 ENCOUNTER — RX RENEWAL (OUTPATIENT)
Age: 54
End: 2019-12-05

## 2019-12-05 RX ORDER — AMOXICILLIN 500 MG/1
500 CAPSULE ORAL 3 TIMES DAILY
Qty: 30 | Refills: 0 | Status: DISCONTINUED | COMMUNITY
Start: 2017-08-07 | End: 2019-12-05

## 2019-12-13 ENCOUNTER — RESULT REVIEW (OUTPATIENT)
Age: 54
End: 2019-12-13

## 2019-12-13 ENCOUNTER — APPOINTMENT (OUTPATIENT)
Dept: HEMATOLOGY ONCOLOGY | Facility: CLINIC | Age: 54
End: 2019-12-13
Payer: MEDICARE

## 2019-12-13 VITALS
OXYGEN SATURATION: 100 % | RESPIRATION RATE: 16 BRPM | TEMPERATURE: 98.5 F | WEIGHT: 262.35 LBS | HEART RATE: 78 BPM | BODY MASS INDEX: 33.68 KG/M2 | SYSTOLIC BLOOD PRESSURE: 141 MMHG | DIASTOLIC BLOOD PRESSURE: 85 MMHG

## 2019-12-13 LAB
HCT VFR BLD CALC: 46 % — SIGNIFICANT CHANGE UP (ref 39–50)
HGB BLD-MCNC: 14.9 G/DL — SIGNIFICANT CHANGE UP (ref 13–17)
MCHC RBC-ENTMCNC: 29.5 PG — SIGNIFICANT CHANGE UP (ref 27–34)
MCHC RBC-ENTMCNC: 32.4 G/DL — SIGNIFICANT CHANGE UP (ref 32–36)
MCV RBC AUTO: 91.2 FL — SIGNIFICANT CHANGE UP (ref 80–100)
PLATELET # BLD AUTO: 259 K/UL — SIGNIFICANT CHANGE UP (ref 150–400)
RBC # BLD: 5.04 M/UL — SIGNIFICANT CHANGE UP (ref 4.2–5.8)
RBC # FLD: 13.6 % — SIGNIFICANT CHANGE UP (ref 10.3–14.5)
WBC # BLD: 8.3 K/UL — SIGNIFICANT CHANGE UP (ref 3.8–10.5)
WBC # FLD AUTO: 8.3 K/UL — SIGNIFICANT CHANGE UP (ref 3.8–10.5)

## 2019-12-13 PROCEDURE — 99215 OFFICE O/P EST HI 40 MIN: CPT

## 2019-12-14 NOTE — ASSESSMENT
[Supportive] : Goals of care discussed with patient: Supportive [FreeTextEntry1] : Continued issues w persistent complex neuropathic pain syndrome assoc w opioid dep. and polypharmacy issues.Psychiatric overlays as prev documented. Additional weaning  planned..has cut his use by 20pct since starting ([phone]).CBC done today shows HCT of 46

## 2019-12-14 NOTE — REVIEW OF SYSTEMS
[Joint Pain] : joint pain [Joint Stiffness] : joint stiffness [Muscle Pain] : muscle pain [Depression] : depression [Anxiety] : anxiety [Muscle Weakness] : muscle weakness [Negative] : Endocrine

## 2019-12-14 NOTE — HISTORY OF PRESENT ILLNESS
[de-identified] : Compa is a 50-year-old man with a history of HIV disease currently under control with antiviral therapy. He is been seen here for many years with regards to management of neuropathic pain which he suffers from moderate ongoing basis. The patient has been on significant amounts of opiate-based therapies over the years and had been on higher doses of methadone to help control the neuropathic pain. However, as a result of sleep apnea issues, this is been considerably lowered from a high of approximately 150 mg down to 40 mg. This is resulted in improvement in his symptoms as well as CPAP therapy at home.the patient is also a diabetic, and in addition,. the patient uses high doses of oxycodone as well to control pain in his legs feet and lower back. He also suffers from severe depression and last year it was essentially in his bedroom from January until June as a result of a breakup with his partner. The patient is [place]  and has had trouble with sustaining relationships over the years.  [de-identified] : Continues w ongoing dialogue re slow wean.He says it is more difficult to control his pain, but .Pt appears to be sincere in lowerring his useage of oxycodone, as i emphasized this trial; must continue.Hasnt had HIV or DM status checked in over a year..due for eval of both ( which may be impacting his pain ).States is anemic

## 2019-12-30 ENCOUNTER — RX RENEWAL (OUTPATIENT)
Age: 54
End: 2019-12-30

## 2020-03-26 ENCOUNTER — APPOINTMENT (OUTPATIENT)
Dept: HEMATOLOGY ONCOLOGY | Facility: CLINIC | Age: 55
End: 2020-03-26
Payer: MEDICARE

## 2020-03-26 PROCEDURE — 99442: CPT

## 2020-07-08 ENCOUNTER — OUTPATIENT (OUTPATIENT)
Dept: OUTPATIENT SERVICES | Facility: HOSPITAL | Age: 55
LOS: 1 days | Discharge: HOME | End: 2020-07-08

## 2020-07-08 DIAGNOSIS — M79.609 PAIN IN UNSPECIFIED LIMB: ICD-10-CM

## 2020-07-09 ENCOUNTER — APPOINTMENT (OUTPATIENT)
Dept: HEMATOLOGY ONCOLOGY | Facility: CLINIC | Age: 55
End: 2020-07-09
Payer: MEDICARE

## 2020-07-09 PROCEDURE — 99443: CPT

## 2020-07-28 ENCOUNTER — APPOINTMENT (OUTPATIENT)
Dept: HEMATOLOGY ONCOLOGY | Facility: CLINIC | Age: 55
End: 2020-07-28

## 2020-10-09 ENCOUNTER — OUTPATIENT (OUTPATIENT)
Dept: OUTPATIENT SERVICES | Facility: HOSPITAL | Age: 55
LOS: 1 days | Discharge: HOME | End: 2020-10-09

## 2020-10-09 DIAGNOSIS — M79.609 PAIN IN UNSPECIFIED LIMB: ICD-10-CM

## 2020-10-20 ENCOUNTER — APPOINTMENT (OUTPATIENT)
Dept: HEMATOLOGY ONCOLOGY | Facility: CLINIC | Age: 55
End: 2020-10-20
Payer: MEDICARE

## 2020-10-20 PROCEDURE — 99215 OFFICE O/P EST HI 40 MIN: CPT | Mod: 95

## 2020-10-22 NOTE — ASSESSMENT
[FreeTextEntry1] : Continued issues w persistent complex neuropathic pain syndrome assoc w high dose opioid dep. and polypharmacy issues.Psychiatric overlays as prev documented. Additional weaning  planned..has cut his use by 25pct since starting ([phone]).Pt aware of my plans to retire in november, and will f/u w Dr Hawley in this Division for now. [Supportive] : Goals of care discussed with patient: Supportive

## 2020-10-22 NOTE — HISTORY OF PRESENT ILLNESS
[de-identified] : Compa is a 50-year-old man with a history of HIV disease currently under control with antiviral therapy. He is been seen here for many years with regards to management of neuropathic pain which he suffers from moderate ongoing basis. The patient has been on significant amounts of opiate-based therapies over the years and had been on higher doses of methadone to help control the neuropathic pain. However, as a result of sleep apnea issues, this is been considerably lowered from a high of approximately 150 mg down to 40 mg. This is resulted in improvement in his symptoms as well as CPAP therapy at home.the patient is also a diabetic, and in addition,. the patient uses high doses of oxycodone as well to control pain in his legs feet and lower back. He also suffers from severe depression and last year it was essentially in his bedroom from January until June as a result of a breakup with his partner. The patient is [place]  and has had trouble with sustaining relationships over the years.  [de-identified] : Structured visit.Continues w ongoing dialogue re slow wean.He says it is more difficult to control his pain, but .Pt appears to be sincere in lowerring his useage of oxycodone, as I emphasized this trial; must continue.Still needs to follow w ID, re his HIV status.

## 2020-10-22 NOTE — REASON FOR VISIT
[Home] : at home, [unfilled] , at the time of the visit. [Medical Office: ([facility])___] : at the medical office located in  [Verbal consent obtained from patient] : the patient, [unfilled] [Follow-Up Visit] : a follow-up [FreeTextEntry2] : pain management

## 2020-11-03 ENCOUNTER — RX RENEWAL (OUTPATIENT)
Age: 55
End: 2020-11-03

## 2020-11-05 LAB — OXYCODONE UR-MCNC: POSITIVE

## 2020-12-15 PROBLEM — J06.9 ACUTE UPPER RESPIRATORY INFECTION: Status: RESOLVED | Noted: 2017-08-07 | Resolved: 2020-12-15

## 2021-01-15 ENCOUNTER — OUTPATIENT (OUTPATIENT)
Dept: OUTPATIENT SERVICES | Facility: HOSPITAL | Age: 56
LOS: 1 days | Discharge: HOME | End: 2021-01-15

## 2021-01-15 DIAGNOSIS — M79.609 PAIN IN UNSPECIFIED LIMB: ICD-10-CM

## 2021-01-21 ENCOUNTER — APPOINTMENT (OUTPATIENT)
Dept: HEMATOLOGY ONCOLOGY | Facility: CLINIC | Age: 56
End: 2021-01-21
Payer: COMMERCIAL

## 2021-01-21 PROCEDURE — 99205 OFFICE O/P NEW HI 60 MIN: CPT | Mod: 95

## 2021-01-21 RX ORDER — ONDANSETRON 8 MG/1
8 TABLET, ORALLY DISINTEGRATING ORAL
Qty: 60 | Refills: 0 | Status: DISCONTINUED | COMMUNITY
Start: 2017-07-11 | End: 2021-01-21

## 2021-01-21 RX ORDER — DEXTROAMPHETAMINE SACCHARATE, AMPHETAMINE ASPARTATE, DEXTROAMPHETAMINE SULFATE AND AMPHETAMINE SULFATE 7.5; 7.5; 7.5; 7.5 MG/1; MG/1; MG/1; MG/1
30 TABLET ORAL
Qty: 60 | Refills: 0 | Status: DISCONTINUED | COMMUNITY
Start: 2018-01-26 | End: 2021-01-21

## 2021-02-10 NOTE — HISTORY OF PRESENT ILLNESS
[FreeTextEntry1] : 55yoM with HIV (dx 1989 - on Kaletra, Epzicom), chronic peripheral neuropathy being seen for initial visit via Telemedicine. Patient had been following with Dr. Mario Yeung for many years and is now transferring care to me for pain management. \par PMH also significant for MESSI, ADD, homebound, bipolar d/o, DM2 (recent A1c was 6.9), overactive bladder (on oxybutynin), hypotestosteronemia. \par \par Patient reports that his HIV neuropathy began around 1990. The neuropathy manifests as burning in the soles of his feet that travels up his legs to the level of the thighs. He also reports a numbness of his feet, can't feel the ground when he walks.  He gets intermittent electric shock sensations up his legs.  Other times he feels a sensation of that \par \par Uses a cane or a walker around his home.  Uses a scooter when he leaves his house. \par \par Current pain regimen:\par Methadone 20mg BID\par Oxycodone 60-90mg every 3-4 hours (uses up to 13 Oxycodone 30mg tablets)\par Pregabalin 100mg BID\par \par (had been on gabapentin, duloxetine in past)\par \par ROS: \par +R drop foot \par +anxiety - uses alprazolam 2mg twice daily\par +depression - uses fluoxetine daily\par +chronic migraines - uses imitrex PRN\par +nausea - uses ondansetron daily\par +trouble sleeping - wakes up every hour or so, he believes it is due to MESSI\par Denies constipation, appetite loss, \par \par Reports that he couldn't tolerate wearing the CPAP mask in the past; has not followed up with sleep specialist. \par \par Patient is single, lives alone. He has an HHA through home care, 5 hours/day, 7 days/week. Receives long-term home care services through [facility]. \par \par Primary Care: Angeline Webb NP\par \par I-Stop Ref#: [id number]

## 2021-02-10 NOTE — ASSESSMENT
[FreeTextEntry1] : 55yoM with:\par \par 1. HIV Neuropathy - Discussed with patient that his high usage of PRN oxycodone is unorthodox and not something I plan to continue for the long term. His methadone regimen can be increased to help lower the usage of oxycodone.  Increase methadone to 20mg TID.  c/w pregabalin 100mg BID\par \par 2. HIV - Recommend that patient establish care with ID specialist. He requires regular follow up. \par \par 3. Anxiety/Depression - Patient used to follow with a psychiatrist through [facility]. Recommend he re-establish care.\par \par 4. MESSI - Discussed the importance of seeing a sleep specialist. \par \par 5. Encounter for Palliative Care - Emotional support provided.\par \par Follow up in 1 month, call sooner with questions or issues.

## 2021-02-11 ENCOUNTER — APPOINTMENT (OUTPATIENT)
Dept: HEMATOLOGY ONCOLOGY | Facility: CLINIC | Age: 56
End: 2021-02-11
Payer: MEDICARE

## 2021-02-11 PROCEDURE — 99213 OFFICE O/P EST LOW 20 MIN: CPT | Mod: 95

## 2021-02-11 NOTE — ASSESSMENT
[FreeTextEntry1] : 55yoM with:\par \par 1. HIV Neuropathy - Discussed with patient that his high usage of PRN oxycodone is unorthodox and not something I plan to continue for the long term. Will readdress post-COVID infection.  C/w methadone 20mg TID.  c/w pregabalin 100mg BID\par \par 2. HIV - Recommend that patient establish care with ID specialist. He requires regular follow up. Request sent to [facility] on behalf of the patient. \par \par 3. Anxiety/Depression - Patient used to follow with a psychiatrist through [facility]. Recommend he re-establish care. \par \par 4. MESSI - Discussed the importance of seeing a sleep specialist. \par \par 5. Encounter for Palliative Care - Emotional support provided.\par \par Follow up in 1 month, call sooner with questions or issues.

## 2021-02-11 NOTE — HISTORY OF PRESENT ILLNESS
[Home] : at home, [unfilled] , at the time of the visit. [Medical Office: ([facility])___] : at the medical office located in  [Verbal consent obtained from patient] : the patient, [unfilled] [FreeTextEntry1] : 55yoM with HIV (dx 1989 - on Kaletra, Epzicom), chronic peripheral neuropathy being seen for follow up visit via Telemedicine. Patient had been following with Dr. Mario Yeung for many years and is now transferring care to me for pain management. \par PMH also significant for MESSI, ADD, homebound, bipolar d/o, DM2 (recent A1c was 6.9), overactive bladder (on oxybutynin), hypotestosteronemia. \par \par Patient reports that his HIV neuropathy began around 1990. The neuropathy manifests as burning in the soles of his feet that travels up his legs to the level of the thighs. He also reports a numbness of his feet, can't feel the ground when he walks.  He gets intermittent electric shock sensations up his legs.  Other times he feels a sensation of that \par \par Uses a cane or a walker around his home.  Uses a scooter when he leaves his house. \par \par Current pain regimen:\par Methadone 20mg BID\par Oxycodone 60-90mg every 3-4 hours (uses up to 13 Oxycodone 30mg tablets)\par Pregabalin 100mg BID\par \par (had been on gabapentin, duloxetine in past)\par \par Interval Hx: \par Was recently diagnosed with COVID. Was treated with Amoxicillin for a presumed sinus infection. He has been having severe headaches that start in the occiput and extend to the front. The imitrex helps for a short period but the pain recurs after 2 hours. His NP prescribed Fioricet which helps a bit.  Also has been having diarrhea. \par \par He increased the methadone to TID, hasn't been able to fully appreciate the change in his pain due to the concurrent COVID infection. He hasn't been able to use less oxycodone at this time. \par \par ROS: \par +R drop foot \par +anxiety - uses alprazolam 2mg twice daily\par +depression - uses fluoxetine daily\par +chronic migraines - uses imitrex PRN\par +nausea - uses ondansetron daily\par +trouble sleeping - wakes up every hour or so, he believes it is due to MESSI\par Denies constipation, appetite loss, \par \par Reports that he couldn't tolerate wearing the CPAP mask in the past; has not followed up with sleep specialist. \par \par Patient is single, lives alone. He has an HHA through home care, 5 hours/day, 7 days/week. [facility] \par \par Primary Care: Angeline Webb NP\par \par I-Stop Ref#: [id number]

## 2021-02-11 NOTE — PHYSICAL EXAM
[General Appearance - Alert] : alert [General Appearance - In No Acute Distress] : in no acute distress [FreeTextEntry1] : edentulous

## 2021-03-10 ENCOUNTER — OUTPATIENT (OUTPATIENT)
Dept: OUTPATIENT SERVICES | Facility: HOSPITAL | Age: 56
LOS: 1 days | Discharge: HOME | End: 2021-03-10

## 2021-03-10 DIAGNOSIS — M79.609 PAIN IN UNSPECIFIED LIMB: ICD-10-CM

## 2021-03-12 ENCOUNTER — APPOINTMENT (OUTPATIENT)
Dept: HEMATOLOGY ONCOLOGY | Facility: CLINIC | Age: 56
End: 2021-03-12
Payer: MEDICARE

## 2021-03-12 PROCEDURE — 99213 OFFICE O/P EST LOW 20 MIN: CPT | Mod: 95

## 2021-03-23 NOTE — ASSESSMENT
[FreeTextEntry1] : 55yoM with:\par \par 1. HIV Neuropathy - Discussed with patient that his high usage of PRN oxycodone is unorthodox and not something I plan to continue for the long term. C/w methadone 20mg TID.  c/w pregabalin 100mg BID\par \par 2. HIV - Recommend that patient establish care with ID specialist. He requires regular follow up. Request re-sent to [facility] on behalf of the patient. \par \par 3. Anxiety/Depression - Patient used to follow with a psychiatrist through [facility]. Recommend he re-establish care. \par \par 4. MESSI - Discussed the importance of seeing a sleep specialist. \par \par 5. Encounter for Palliative Care - Emotional support provided.\par \par Follow up in 1 month, call sooner with questions or issues.

## 2021-03-23 NOTE — HISTORY OF PRESENT ILLNESS
[Home] : at home, [unfilled] , at the time of the visit. [Medical Office: ([facility])___] : at the medical office located in  [Verbal consent obtained from patient] : the patient, [unfilled] [FreeTextEntry1] : 55yoM with HIV (dx 1989 - on Kaletra, Epzicom), chronic peripheral neuropathy being seen for follow up visit via Telemedicine. Patient had been following with Dr. Mario Yeung for many years and is now transferring care to me for pain management. \par PMH also significant for MESSI, ADD, homebound, bipolar d/o, DM2 (recent A1c was 6.9), overactive bladder (on oxybutynin), hypotestosteronemia. \par \par Patient reports that his HIV neuropathy began around 1990. The neuropathy manifests as burning in the soles of his feet that travels up his legs to the level of the thighs. He also reports a numbness of his feet, can't feel the ground when he walks.  He gets intermittent electric shock sensations up his legs.  Other times he feels a sensation of that \par \par Uses a cane or a walker around his home.  Uses a scooter when he leaves his house. \par \par Had been on gabapentin, duloxetine in past without improvement of his neuropathy. \par \par Was diagnosed with COVID early February. Was treated with Amoxicillin for a presumed sinus infection. He has been having severe headaches that start in the occiput and extend to the front. The imitrex helps for a short period but the pain recurs after 2 hours. His NP prescribed Fioricet which helps a bit.  Also has been having diarrhea. \par \par He increased the methadone to TID, hasn't been able to fully appreciate the change in his pain due to the concurrent COVID infection. He hasn't been able to use less oxycodone at this time. \par \par Interval Hx: \par He is recovering from recent COVID infection, has some residual SOB.  \par Will be going to optometrist today for eye exam, needs new glasses.  \par \par Current pain regimen:\par Methadone 20mg TID\par Oxycodone 60-90mg every 3-4 hours (uses up to 13 Oxycodone 30mg tablets)\par Pregabalin 100mg BID\par \par ROS: \par +R drop foot \par +anxiety - uses alprazolam 2mg twice daily\par +depression - uses fluoxetine daily\par +chronic migraines - uses imitrex PRN\par +nausea - uses ondansetron daily\par +trouble sleeping - wakes up every hour or so, he believes it is due to MESSI. \par Denies constipation, appetite loss, \par \par Reports that he couldn't tolerate wearing the CPAP mask in the past; has not followed up with sleep specialist. \par \par Patient is single, lives alone. He has an HHA through home care, 5 hours/day, 7 days/week. Receives long-term home care services through [facility]. \par \par Primary Care: Angeline Webb NP\par \par I-Stop Ref#: [id number]

## 2021-04-11 ENCOUNTER — OUTPATIENT (OUTPATIENT)
Dept: OUTPATIENT SERVICES | Facility: HOSPITAL | Age: 56
LOS: 1 days | Discharge: HOME | End: 2021-04-11

## 2021-04-11 DIAGNOSIS — M79.609 PAIN IN UNSPECIFIED LIMB: ICD-10-CM

## 2021-04-15 ENCOUNTER — APPOINTMENT (OUTPATIENT)
Dept: HEMATOLOGY ONCOLOGY | Facility: CLINIC | Age: 56
End: 2021-04-15
Payer: MEDICARE

## 2021-04-15 PROCEDURE — 99214 OFFICE O/P EST MOD 30 MIN: CPT | Mod: 95

## 2021-04-15 NOTE — HISTORY OF PRESENT ILLNESS
[FreeTextEntry1] : 55yoM with HIV (dx 1989 - on Kaletra, Epzicom), chronic peripheral neuropathy being seen for follow up visit via Telemedicine. Patient had been following with Dr. Mario Yeung for many years and is now transferring care to me for pain management. \par PMH also significant for MESSI, ADD, homebound, bipolar d/o, DM2 (recent A1c was 6.9), overactive bladder (on oxybutynin), hypotestosteronemia. \par \par Patient reports that his HIV neuropathy began around 1990. The neuropathy manifests as burning in the soles of his feet that travels up his legs to the level of the thighs. He also reports a numbness of his feet, can't feel the ground when he walks.  He gets intermittent electric shock sensations up his legs.  Other times he feels a sensation of that \par \par Uses a cane or a walker around his home.  Uses a scooter when he leaves his house. \par \par Had been on gabapentin, duloxetine in past without improvement of his neuropathy. \par \par Was diagnosed with COVID early February. Was treated with Amoxicillin for a presumed sinus infection. He has been having severe headaches that start in the occiput and extend to the front. The imitrex helps for a short period but the pain recurs after 2 hours. His NP prescribed Fioricet which helps a bit.  Also has been having diarrhea. \par \par He increased the methadone to TID, hasn't been able to fully appreciate the change in his pain due to the concurrent COVID infection. He hasn't been able to use less oxycodone at this time. \par \par Interval Hx: \par He is recovering from recent COVID infection, has some residual SOB.  \par Will be going to optometrist today for eye exam, needs new glasses.  \par \par Current pain regimen:\par Methadone 20mg TID\par Oxycodone 60-90mg every 3-4 hours (uses up to 13 Oxycodone 30mg tablets)\par Pregabalin 100mg BID\par \par ROS: \par +R drop foot \par +anxiety - uses alprazolam 2mg twice daily\par +depression - uses fluoxetine daily\par +chronic migraines - uses imitrex PRN\par +nausea - uses ondansetron daily\par +trouble sleeping - wakes up every hour or so, he believes it is due to MESSI. \par Denies constipation, appetite loss, \par \par Reports that he couldn't tolerate wearing the CPAP mask in the past; has not followed up with sleep specialist. \par \par Patient is single, lives alone. He has an HHA through home care, 5 hours/day, 7 days/week. Receives long-term home care services through [facility]. \par \par Primary Care: Angeline Webb NP\par \par I-Stop Ref#: [id number] [Home] : at home, [unfilled] , at the time of the visit. [Medical Office: ([facility])___] : at the medical office located in  [Verbal consent obtained from patient] : the patient, [unfilled]

## 2021-04-15 NOTE — ASSESSMENT
[FreeTextEntry1] : 55yoM with:\par \par 1. HIV Neuropathy - Discussed with patient that his high usage of PRN oxycodone is unorthodox and not something I plan to continue for the long term. C/w methadone 20mg TID.  Increase pregabalin to 150mg BID. \par \par 2. HIV - Recommend that patient establish care with ID specialist - [facility] has been contacted twice by me. Will follow up with them. He requires regular follow up with ID. \par \par 3. Anxiety/Depression - Provided patient with the name and contact information for [facility] where I recommend he establish care for psychiatric services.\par \par 4. MESSI - Discussed the importance of seeing a sleep specialist. \par \par 5. Encounter for Palliative Care - Emotional support provided.\par \par Follow up in 1 month, call sooner with questions or issues.

## 2021-05-14 ENCOUNTER — OUTPATIENT (OUTPATIENT)
Dept: OUTPATIENT SERVICES | Facility: HOSPITAL | Age: 56
LOS: 1 days | Discharge: HOME | End: 2021-05-14

## 2021-05-17 ENCOUNTER — APPOINTMENT (OUTPATIENT)
Dept: HEMATOLOGY ONCOLOGY | Facility: CLINIC | Age: 56
End: 2021-05-17
Payer: MEDICARE

## 2021-05-17 PROCEDURE — 99213 OFFICE O/P EST LOW 20 MIN: CPT | Mod: 95

## 2021-05-20 NOTE — HISTORY OF PRESENT ILLNESS
[Home] : at home, [unfilled] , at the time of the visit. [Medical Office: ([facility])___] : at the medical office located in  [Verbal consent obtained from patient] : the patient, [unfilled] [FreeTextEntry1] : 55yoM with HIV (dx 1989 - on Kaletra, Epzicom), chronic peripheral neuropathy being seen for follow up visit via Telemedicine. Patient had been following with Dr. Mario Yeung for many years and is now transferring care to me for pain management. \par PMH also significant for MESSI, ADD, homebound, bipolar d/o, DM2 (recent A1c was 6.9), overactive bladder (on oxybutynin), hypotestosteronemia. \par \par Patient reports that his HIV neuropathy began around 1990. The neuropathy manifests as burning in the soles of his feet that travels up his legs to the level of the thighs. He also reports a numbness of his feet, can't feel the ground when he walks.  He gets intermittent electric shock sensations up his legs.  Other times he feels a sensation of that \par \montrell Uses a cane or a walker around his home.  Uses a scooter when he leaves his house. \par \montrell Had been on gabapentin, duloxetine in past without improvement of his neuropathy. \par \montrell Was diagnosed with COVID early February. Was treated with Amoxicillin for a presumed sinus infection. He has been having severe headaches that start in the occiput and extend to the front. The imitrex helps for a short period but the pain recurs after 2 hours. His NP prescribed Fioricet which helps a bit.  Also has been having diarrhea. \par \par He increased the methadone to TID, hasn't been able to fully appreciate the change in his pain due to the concurrent COVID infection. He hasn't been able to use less oxycodone at this time. \par \par Interval Hx (5/17/21): \montrell Lost his footing when walking down the stairs yesterday and he fell, sliding down the stairs on his back side.  He is having soreness today. \par \par He has attempted to make an appointment with mental health clinic, has been having difficulty due to insurance coverage issues. \par \par Current pain regimen:\par Methadone 20mg TID\par Oxycodone 60-90mg every 3-4 hours (uses up to 13 Oxycodone 30mg tablets)\par Pregabalin 100mg BID\par \par ROS: \par +R drop foot \par +anxiety - uses alprazolam 2mg twice daily\par +depression - uses fluoxetine daily\par +chronic migraines - uses imitrex PRN\par +nausea - uses ondansetron daily\par +trouble sleeping - wakes up every hour or so, he believes it is due to MESSI. \par Denies constipation, appetite loss, \par \par He has been trying to use his rolling walker more regularly to avoid accidents due to his foot drop.\par \par Reports that he couldn't tolerate wearing the CPAP mask in the past; has not followed up with sleep specialist. \par \par Patient is single, lives alone. He has an HHA through home care, 5 hours/day, 7 days/week. Receives long-term home care services through [facility]. \par \par Primary Care: Angeline Webb NP\par \par I-Stop Ref#: [id number]

## 2021-05-20 NOTE — ASSESSMENT
[FreeTextEntry1] : 55yoM with:\par \par 1. HIV Neuropathy - Discussed with patient that his high usage of PRN oxycodone is unorthodox and not something I plan to continue for the long term. C/w methadone 20mg TID.  C/w pregabalin 150mg BID. \par \par 2. HIV - Recommend that patient establish care with ID specialist - [facility] has been contacted twice by me. Will follow up with them. He requires regular follow up with ID. \par \par 3. Anxiety/Depression - Patient is working with his insurance company to find mental health care that is covered. \par \par 4. MESSI - Discussed the importance of seeing a sleep specialist. \par \par 5. Encounter for Palliative Care - Emotional support provided.\par \par Follow up in 1 month, call sooner with questions or issues.

## 2021-08-24 ENCOUNTER — RX RENEWAL (OUTPATIENT)
Age: 56
End: 2021-08-24

## 2021-08-25 ENCOUNTER — OUTPATIENT (OUTPATIENT)
Dept: OUTPATIENT SERVICES | Facility: HOSPITAL | Age: 56
LOS: 1 days | Discharge: HOME | End: 2021-08-25

## 2021-08-25 DIAGNOSIS — M79.609 PAIN IN UNSPECIFIED LIMB: ICD-10-CM

## 2021-08-26 ENCOUNTER — APPOINTMENT (OUTPATIENT)
Dept: HEMATOLOGY ONCOLOGY | Facility: CLINIC | Age: 56
End: 2021-08-26
Payer: MEDICARE

## 2021-08-26 PROCEDURE — 99213 OFFICE O/P EST LOW 20 MIN: CPT | Mod: 95

## 2021-09-03 NOTE — ASSESSMENT
[FreeTextEntry1] : 55yoM with:\par \par 1. HIV Neuropathy - Discussed with patient that his high usage of PRN oxycodone is unorthodox and not something I plan to continue for the long term. C/w methadone 20mg TID - would like to increase this dose in order to decrease his oxycodone but will need him to come in for EKG. He must come in person for next month's visit as he has had ample time to attempt to receive his COVID vaccination.  C/w pregabalin 150mg BID. Will need to start tapering his oxycodone down.  Patient will be better served setting up care with a chronic pain specialist.\par \par 2. HIV - Have previously recommended that patient establish care with ID specialist - [facility] has been contacted previously by me.  Patient has been having difficulty with physically leaving his house to make it to office visits. \par \par 3. Anxiety/Depression - Patient is working with his insurance company to find mental health care that is covered. \par \par 4. MESSI - Discussed the importance of seeing a sleep specialist. \par \par 5. Encounter for Palliative Care - Emotional support provided.\par \par Follow up in 1 month in person in the office.

## 2021-09-03 NOTE — HISTORY OF PRESENT ILLNESS
[Home] : at home, [unfilled] , at the time of the visit. [Medical Office: ([facility])___] : at the medical office located in  [Verbal consent obtained from patient] : the patient, [unfilled] [FreeTextEntry1] : 55yoM with HIV (dx 1989 - on Kaletra, Epzicom), chronic peripheral neuropathy being seen for follow up visit via Telemedicine. Patient had been following with Dr. Mario Yeung for many years and is now transferring care to me for pain management. \par PMH also significant for MESSI, ADD, homebound, bipolar d/o, DM2 (recent A1c was 6.9), overactive bladder (on oxybutynin), hypotestosteronemia. \par \par Patient reports that his HIV neuropathy began around 1990. The neuropathy manifests as burning in the soles of his feet that travels up his legs to the level of the thighs. He also reports a numbness of his feet, can't feel the ground when he walks.  He gets intermittent electric shock sensations up his legs.  Other times he feels a sensation of that \par \par Uses a cane or a walker around his home.  Uses a scooter when he leaves his house. \par \par Had been on gabapentin, duloxetine in past without improvement of his neuropathy. \par \par Was diagnosed with COVID early February. Was treated with Amoxicillin for a presumed sinus infection. He has been having severe headaches that start in the occiput and extend to the front. The imitrex helps for a short period but the pain recurs after 2 hours. His NP prescribed Fioricet which helps a bit.  Also has been having diarrhea. \par \par He increased the methadone to TID, hasn't been able to fully appreciate the change in his pain due to the concurrent COVID infection. He hasn't been able to use less oxycodone at this time. \par \par Interval Hx (8/26/21): \par Patient fell recently in the bathroom and has a few bruises. He is going to be requesting an increase in hours through his [employer] insurance. \par \par Patient states that he knows he needs to work on establishing care with psychiatry and infectious disease. \par He is hoping to set up a home COVID vaccination. He reports no longer having antibodies from his covid infection back in February, per recent blood work his PCP did. \par \par Current pain regimen:\par Methadone 20mg TID\par Oxycodone 60-90mg every 3-4 hours (uses up to 13 Oxycodone 30mg tablets)\par Pregabalin 100mg BID\par \par ROS: \par +R drop foot \par +anxiety - uses alprazolam 2mg twice daily\par +depression - uses fluoxetine daily\par +chronic migraines - uses imitrex PRN\par +nausea - uses ondansetron daily\par +trouble sleeping - wakes up every hour or so, he believes it is due to MESSI. \par Denies constipation, appetite loss, \par \par He has been trying to use his rolling walker more regularly to avoid accidents due to his foot drop.\par \par Reports that he couldn't tolerate wearing the CPAP mask in the past; has not followed up with sleep specialist. \par \par Patient is single, lives alone. He does have a tenant that comes and goes, doesn't interact much with him. He has an HHA through home care, 5 hours/day, 7 days/week. Receives long-term home care services through [facility]. \par \par Primary Care: Angeline Webb NP\par \par I-Stop Ref#: [id number]

## 2021-09-23 ENCOUNTER — OUTPATIENT (OUTPATIENT)
Dept: OUTPATIENT SERVICES | Facility: HOSPITAL | Age: 56
LOS: 1 days | Discharge: HOME | End: 2021-09-23

## 2021-09-23 DIAGNOSIS — M79.609 PAIN IN UNSPECIFIED LIMB: ICD-10-CM

## 2021-09-27 ENCOUNTER — APPOINTMENT (OUTPATIENT)
Dept: HEMATOLOGY ONCOLOGY | Facility: CLINIC | Age: 56
End: 2021-09-27

## 2021-10-19 NOTE — ED PROVIDER NOTE - DIAGNOSIS COUNSELING, MDM
Fall with [facility] Risk conducted a detailed discussion... I had a detailed discussion with the patient and/or guardian regarding the historical points, exam findings, and any diagnostic results supporting the discharge/admit diagnosis.

## 2021-10-20 ENCOUNTER — APPOINTMENT (OUTPATIENT)
Dept: HEMATOLOGY ONCOLOGY | Facility: CLINIC | Age: 56
End: 2021-10-20

## 2021-10-21 ENCOUNTER — APPOINTMENT (OUTPATIENT)
Dept: HEMATOLOGY ONCOLOGY | Facility: CLINIC | Age: 56
End: 2021-10-21
Payer: MEDICARE

## 2021-10-21 PROCEDURE — 99212 OFFICE O/P EST SF 10 MIN: CPT | Mod: 95

## 2021-10-25 ENCOUNTER — APPOINTMENT (OUTPATIENT)
Dept: PAIN MANAGEMENT | Facility: CLINIC | Age: 56
End: 2021-10-25
Payer: MEDICARE

## 2021-10-25 PROCEDURE — 99214 OFFICE O/P EST MOD 30 MIN: CPT | Mod: 95

## 2021-11-01 NOTE — HISTORY OF PRESENT ILLNESS
[Home] : at home, [unfilled] , at the time of the visit. [Medical Office: ([facility])___] : at the medical office located in  [Verbal consent obtained from patient] : the patient, [unfilled] [FreeTextEntry1] : \par This is a case of a 55-year-old gentleman who was referred to me by Dr. Kam Krueger for a consultation regarding his chronic opiate pain medication.  He carries a diagnosis of painful diabetic neuropathy and HIV neuropathy being maintained on methadone 60 mg/day in divided doses and oxycodone 30 mg up to 13 tablets/day.  In the past he reports being on methadone up to 160 mg/day in divided doses and oxycodone up to 18 tablets of 30 mg/day.  He states that this is the only treatment that has helped to control his pain.  On average his numerical analog scale rating is 6-9 over 10 prior to taking his medication reduced to 2-3 over 10 after using his pain medication.  He has been on his current dose of opiate therapy for the past 5 years.  He has tried fentanyl and morphine without benefit.  Trials of Neurontin 300 mg dosage up to 3000 mg/day and Cymbalta were ineffective in controlling his pain.  Patient describes worsening pain when using medical marijuana.\par \par Opiate risk tool 5 - Moderate Risk\par \par Functional status: Patient reports being in bed most of the day only going to the bathroom or kitchen if his aide is not around to help him.  He does not go outside last time being out of the house excluding doctor visits occurred in 2015.\par \par Past medical history anxiety depression diabetes cardiac disease fibromyalgia chronic neck and low back pain.\par \par Social history: He stopped smoking cigarettes 3 years ago.\par \par  [id number] 390 mgs oxycodone plus methadone 60 mgs qd\par \par \par Exam: Alert and well oriented. Able to ambulate with use of cane \par \par Assessment and Plan:\par \par This is a challenging case of a 55-year-old gentleman who has a past medical history of painful diabetic neuropathy and HIV neuropathy, fibromyalgia, migraines who is on chronic opiate therapy.  His functional status is essentially nonexistent.  By his own account, he does not go outside and stays in the bed pretty much all day.  His reasoning for not going out to include his altered some sensation in both feet which leads to unsteadiness, fatigue and "not wanting to be seen publicly since he does not have any teeth".  Of concern, this is a patient who has comorbidities and medical illnesses which are contradictory to chronic opiate therapy and not leading to improved function.  There is no quick fix since he has been on high amount of opiates for many years.\par \par I strongly recommend the following \par #1 - Could consider Suboxone or Belbuca - but you need to reduce his current dose by about half his current total dose. \par #2 - This can be done by tapering his current total opioid dose by 15 % per month until he can transition to #1.\par #3 - Another option is to consider Neurostim trial - there is evidence to support in chronic pain patients with painful DM. I sw Dr. Montesinos who does accept his insurance and is willing to evaluate him for trial.  His cell to confirm is [id number]\par #4 - Can consider increase Lyrica as tolerated and agreed upon by his PMD in view of DM and potential renal disease.   \par \par Let me know if you need additional input.\par \par Michael

## 2021-11-02 NOTE — HISTORY OF PRESENT ILLNESS
[Home] : at home, [unfilled] , at the time of the visit. [Medical Office: ([facility])___] : at the medical office located in  [Verbal consent obtained from patient] : the patient, [unfilled] [FreeTextEntry1] : 55yoM with HIV (dx 1989 - on Kaletra, Epzicom), chronic peripheral neuropathy being seen for follow up visit via Telemedicine. Patient had been following with Dr. Mario Yeung for many years and is now transferring care to me for pain management. \par PMH also significant for MESSI, ADD, homebound, bipolar d/o, DM2 (recent A1c was 6.9), overactive bladder (on oxybutynin), hypotestosteronemia. \par \par Patient reports that his HIV neuropathy began around 1990. The neuropathy manifests as burning in the soles of his feet that travels up his legs to the level of the thighs. He also reports a numbness of his feet, can't feel the ground when he walks.  He gets intermittent electric shock sensations up his legs.  Other times he feels a sensation of that \par \par Uses a cane or a walker around his home.  Uses a scooter when he leaves his house. \par \par Had been on gabapentin, duloxetine in past without improvement of his neuropathy. \par \par Was diagnosed with COVID early February. Was treated with Amoxicillin for a presumed sinus infection. He has been having severe headaches that start in the occiput and extend to the front. The imitrex helps for a short period but the pain recurs after 2 hours. His NP prescribed Fioricet which helps a bit.  Also has been having diarrhea. \par \par He increased the methadone to TID, hasn't been able to fully appreciate the change in his pain due to the concurrent COVID infection. He hasn't been able to use less oxycodone at this time. \par \par Interval Hx (10/21/21): \par Patient converted today's visit to a telemedicine as he was unable to come in person, as has been requested several times previously. He states that he is concerned about going out of his house given that he has not yet received COVID vaccination. \par \par Patient states that he knows he needs to work on establishing care with psychiatry and infectious disease. \par He is hoping to set up a home COVID vaccination. He reports no longer having antibodies from his covid infection back in February, per recent blood work his PCP did. \par \par Current pain regimen:\par Methadone 20mg TID\par Oxycodone 60-90mg every 3-4 hours (uses up to 13 Oxycodone 30mg tablets)\par Pregabalin 100mg BID\par \par ROS: \par +R drop foot \par +anxiety - uses alprazolam 2mg twice daily\par +depression - uses fluoxetine daily\par +chronic migraines - uses imitrex PRN\par +nausea - uses ondansetron daily\par +trouble sleeping - wakes up every hour or so, he believes it is due to MESSI. \par Denies constipation, appetite loss, \par \par He has been trying to use his rolling walker more regularly to avoid accidents due to his foot drop.\par \par Reports that he couldn't tolerate wearing the CPAP mask in the past; has not followed up with sleep specialist. \par \par Patient is single, lives alone. He does have a tenant that comes and goes, doesn't interact much with him. He has an HHA through home care, 5 hours/day, 7 days/week. Receives long-term home care services through [facility]. \par \par Primary Care: Angeline Webb NP\par \par I-Stop Ref#: [id number]

## 2021-11-02 NOTE — ASSESSMENT
[FreeTextEntry1] : 55yoM with:\par \par 1. HIV Neuropathy - Discussed with patient once again that his high usage of PRN oxycodone is unorthodox and is no longer something I can continue. Will initiate referral to Neurology/pain specialist Dr. Gonzalez. \par C/w methadone 20mg TID - ideally would have liked to increase this dose in order to decrease his oxycodone but he has not come if for EKG.  C/w pregabalin 150mg BID. Informed patient that the amount of oxycodone he is currently on is not sustainable and a better pain management plan must be put into effect. \par \par 2. HIV - Have previously recommended that patient establish care with ID specialist - [facility] been contacted previously by me.  Patient has been having difficulty with physically leaving his house to make it to office visits. \par \par 3. Anxiety/Depression - Patient had been working with his insurance company to find mental health care that is covered. \par \par 4. MESSI - Discussed the importance of seeing a sleep specialist. \par \par 5. Encounter for Palliative Care - Emotional support provided.\par \par Will Rx limited supply of his analgesic regimen until he establishes care with new pain specialist. Patient expresses understanding and agreement with this plan.

## 2021-11-04 ENCOUNTER — NON-APPOINTMENT (OUTPATIENT)
Age: 56
End: 2021-11-04

## 2022-01-08 ENCOUNTER — OUTPATIENT (OUTPATIENT)
Dept: OUTPATIENT SERVICES | Facility: HOSPITAL | Age: 57
LOS: 1 days | Discharge: HOME | End: 2022-01-08
Payer: MEDICARE

## 2022-01-10 ENCOUNTER — APPOINTMENT (OUTPATIENT)
Dept: HEMATOLOGY ONCOLOGY | Facility: CLINIC | Age: 57
End: 2022-01-10
Payer: MEDICARE

## 2022-01-10 ENCOUNTER — LABORATORY RESULT (OUTPATIENT)
Age: 57
End: 2022-01-10

## 2022-01-10 VITALS
DIASTOLIC BLOOD PRESSURE: 80 MMHG | OXYGEN SATURATION: 96 % | TEMPERATURE: 96.7 F | WEIGHT: 315 LBS | SYSTOLIC BLOOD PRESSURE: 133 MMHG | BODY MASS INDEX: 40.9 KG/M2 | HEART RATE: 74 BPM | RESPIRATION RATE: 15 BRPM

## 2022-01-10 PROCEDURE — 99214 OFFICE O/P EST MOD 30 MIN: CPT

## 2022-01-10 PROCEDURE — 93010 ELECTROCARDIOGRAM REPORT: CPT

## 2022-01-21 LAB
OXYCODONE, URINE: NORMAL NG/ML
OXYMORPHONE, URINE: 3007 NG/ML

## 2022-01-28 NOTE — HISTORY OF PRESENT ILLNESS
[Home] : at home, [unfilled] , at the time of the visit. [Medical Office: ([facility])___] : at the medical office located in  [Verbal consent obtained from patient] : the patient, [unfilled] [FreeTextEntry1] : 56yoM with HIV (dx 1989 - on Kaletra, Epzicom), chronic peripheral neuropathy being seen for follow up visit via Telemedicine. Patient had been following with Dr. Mario Yeung for many years and is now transferring care to me for pain management. \par PMH also significant for MESSI, ADD, homebound, bipolar d/o, DM2 (recent A1c was 6.9), overactive bladder (on oxybutynin), hypotestosteronemia. \par \par Patient reports that his HIV neuropathy began around 1990. The neuropathy manifests as burning in the soles of his feet that travels up his legs to the level of the thighs. He also reports a numbness of his feet, can't feel the ground when he walks.  He gets intermittent electric shock sensations up his legs.  \par Uses a cane or a walker around his home.  Uses a scooter when he leaves his house. \montrell Had been on gabapentin, duloxetine in past without improvement of his neuropathy. \par \montrell Was diagnosed with COVID early February 2021. Was treated with Amoxicillin for a presumed sinus infection. He has been having severe headaches that start in the occiput and extend to the front. The imitrex helps for a short period but the pain recurs after 2 hours. His NP prescribed Fioricet which helps a bit.  Also has been having diarrhea. \par \par He increased the methadone to TID, hasn't been able to fully appreciate the change in his pain due to the concurrent COVID infection. He hasn't been able to use less oxycodone at this time. \par \par Interval Hx (1/10/22): \par Patient presents today in person for follow up visit. He was evaluated by pain specialist Dr. Rudy De Leon on 12/3/21, was offered spinal cord stimulation for his neuropathic pain and advised to have me taper his opioids. \par \par He reports worsened numbness in his feet lately. He knows he needs to follow up with his podiatrist as well as psychiatry and ID.\par \par Current pain regimen:\par Methadone 30mg BID (prefers BID dosing as he uses it with the Pregabalin)\par Oxycodone 60-90mg every 3-4 hours (uses up to 13 Oxycodone 30mg tablets/day)\par Pregabalin 150mg BID\par \par ROS: \par +R drop foot \par +anxiety - uses alprazolam 2mg twice daily\par +depression - uses fluoxetine daily\par +chronic migraines - uses imitrex PRN\par +nausea - uses ondansetron daily\par +trouble sleeping - wakes up every hour or so, he believes it is due to MESSI. \par Denies constipation, appetite loss, \par \par He has been trying to use his rolling walker more regularly to avoid accidents due to his foot drop.\par \par Reports that he couldn't tolerate wearing the CPAP mask in the past; has not followed up with sleep specialist. \par \par Patient is single, lives alone. He does have a tenant that comes and goes, doesn't interact much with him. He has an HHA through home care, 5 hours/day, 7 days/week. Receives long-term home care services through [facility]. \par \par Primary Care: Angeline Webb NP\par \par I-Stop Ref#: [id number]

## 2022-01-28 NOTE — ASSESSMENT
[FreeTextEntry1] : 56yoM with:\par \par 1. HIV Neuropathy - I have been unable to transfer patient's pain management to a pain specialist thus far.  Will re-attempt a referral through the [facility] at [facility], trying to find a physician that will take patient's insurance. \par ECG done in office today with QTc of 460ms. \par Recommend increasing methadone to 35mg BID and will work on lowering oxycodone usage.  \par C/w pregabalin 150mg BID. \par \par 2. HIV - Have previously recommended that patient establish care with ID specialist - [facility] has been contacted previously by me.  Patient has been having difficulty with physically leaving his house to make it to office visits. \par \par 3. Anxiety/Depression - Patient had been working with his insurance company to find mental health care that is covered. \par \par 4. MESSI - Discussed the importance of seeing a sleep specialist. \par \par 5. Encounter for Palliative Care - Emotional support provided.\par \par Follow up in 1 month, call sooner with questions or issues.

## 2022-01-28 NOTE — PHYSICAL EXAM
[General Appearance - Alert] : alert [General Appearance - In No Acute Distress] : in no acute distress [Neck Appearance] : the appearance of the neck was normal [] : no respiratory distress [Auscultation Breath Sounds / Voice Sounds] : lungs were clear to auscultation bilaterally [Heart Rate And Rhythm] : heart rate was normal and rhythm regular [Heart Sounds] : normal S1 and S2 [Bowel Sounds] : normal bowel sounds [Abdomen Soft] : soft [Skin Color & Pigmentation] : normal skin color and pigmentation [Oriented To Time, Place, And Person] : oriented to person, place, and time [Affect] : the affect was normal [FreeTextEntry1] : obese

## 2022-02-02 LAB
AMPHET UR-MCNC: NORMAL
BARBITURATES UR-MCNC: NORMAL
BENZODIAZ UR-MCNC: NORMAL
COCAINE METAB.OTHER UR-MCNC: NEGATIVE
CREATININE, URINE: 158 MG/DL
METHADONE UR-MCNC: NORMAL
METHAQUALONE UR-MCNC: NEGATIVE
OPIATES UR-MCNC: NEGATIVE
PCP UR-MCNC: NEGATIVE
PROPOXYPH UR QL: NEGATIVE
THC UR QL: NEGATIVE

## 2022-02-07 ENCOUNTER — APPOINTMENT (OUTPATIENT)
Dept: HEMATOLOGY ONCOLOGY | Facility: CLINIC | Age: 57
End: 2022-02-07
Payer: MEDICARE

## 2022-02-07 DIAGNOSIS — M79.2 NEURALGIA AND NEURITIS, UNSPECIFIED: ICD-10-CM

## 2022-02-07 PROCEDURE — 99214 OFFICE O/P EST MOD 30 MIN: CPT | Mod: 95

## 2022-02-07 NOTE — ASSESSMENT
[FreeTextEntry1] : 56yoM with:\par \par 1. HIV Neuropathy - I have been unable to transfer patient's pain management to a pain specialist thus far.  Will re-attempt a referral through the [facility] center at [facility], trying to find a physician that will take patient's insurance. \par ECG done in office last month with QTc of 460ms. \par Recommend increasing methadone to 35mg BID and will work on lowering oxycodone usage.  Patient to do so.\par C/w pregabalin 150mg BID. \par \par 2. HIV - Have previously recommended that patient establish care with ID specialist - [facility] been contacted previously by me.  Patient has been having difficulty with physically leaving his house to make it to office visits. \par \par 3. Anxiety/Depression - Patient had been working with his insurance company to find mental health care that is covered. \par \par 4. MESSI - Discussed the importance of seeing a sleep specialist. \par \par 5. Encounter for Palliative Care - Emotional support provided.\par \par Follow up in 1 month, call sooner with questions or issues.

## 2022-02-07 NOTE — HISTORY OF PRESENT ILLNESS
[Home] : at home, [unfilled] , at the time of the visit. [Medical Office: ([facility])___] : at the medical office located in  [Verbal consent obtained from patient] : the patient, [unfilled] [FreeTextEntry1] : 56yoM with HIV (dx 1989 - on Kaletra, Epzicom), chronic peripheral neuropathy being seen for follow up visit via Telemedicine. Patient had been following with Dr. Mario Yeung for many years and is now transferring care to me for pain management. \par PMH also significant for MESSI, ADD, homebound, bipolar d/o, DM2 (recent A1c was 6.9), overactive bladder (on oxybutynin), hypotestosteronemia. \par \par Patient reports that his HIV neuropathy began around 1990. The neuropathy manifests as burning in the soles of his feet that travels up his legs to the level of the thighs. He also reports a numbness of his feet, can't feel the ground when he walks.  He gets intermittent electric shock sensations up his legs.  \par Uses a cane or a walker around his home.  Uses a scooter when he leaves his house. \par Had been on gabapentin, duloxetine in past without improvement of his neuropathy. \par \par Was diagnosed with COVID early February 2021. Was treated with Amoxicillin for a presumed sinus infection. He has been having severe headaches that start in the occiput and extend to the front. The imitrex helps for a short period but the pain recurs after 2 hours. His NP prescribed Fioricet which helps a bit.  Also has been having diarrhea. \par \par \par Interval Hx (2/7/22): \par Patient seen via telemedicine. He has appointment with Pain Management Dr. Swenson on 2/14/21.  \par He has not yet increased the methadone dose to 35mg BID from 30mg BID, which may have not been well communicated during our last visit.  He is open to doing so now. \par \par He reports worsened numbness in his feet lately. He knows he needs to follow up with his podiatrist as well as psychiatry and ID.\par \par Current pain regimen:\par Methadone 30mg BID (prefers BID dosing as he uses it with the Pregabalin)\par Oxycodone 60-90mg every 3-4 hours (uses up to 13 Oxycodone 30mg tablets/day)\par Pregabalin 150mg BID\par \par ROS: \par +R drop foot \par +anxiety - uses alprazolam 2mg twice daily\par +depression - uses fluoxetine daily\par +chronic migraines - have been occurring almost daily; uses imitrex PRN\par +nausea - uses ondansetron daily\par +trouble sleeping - wakes up every hour or so, he believes it is due to MESSI. \par Denies constipation, appetite loss, \par \par He has been trying to use his rolling walker more regularly to avoid accidents due to his foot drop.\par \par Reports that he couldn't tolerate wearing the CPAP mask in the past; has not followed up with sleep specialist. \par \par Patient is single, lives alone. He does have a tenant that comes and goes, doesn't interact much with him. He has an HHA through home care, 5 hours/day, 7 days/week. [facility] \par \par Primary Care: Angeline Webb NP\par \par I-Stop Ref#: [id number]

## 2022-02-14 ENCOUNTER — APPOINTMENT (OUTPATIENT)
Dept: PAIN MANAGEMENT | Facility: CLINIC | Age: 57
End: 2022-02-14
Payer: MEDICARE

## 2022-02-14 DIAGNOSIS — Z79.899 OTHER LONG TERM (CURRENT) DRUG THERAPY: ICD-10-CM

## 2022-02-14 DIAGNOSIS — Z79.891 LONG TERM (CURRENT) USE OF OPIATE ANALGESIC: ICD-10-CM

## 2022-02-14 PROCEDURE — 99214 OFFICE O/P EST MOD 30 MIN: CPT | Mod: 95

## 2022-02-14 NOTE — REVIEW OF SYSTEMS
[Negative] : Heme/Lymph [Back Pain] : back pain [Muscle Pain] : muscle pain [Radiating Pain] : radiating pain [Headache] : headache [Depression] : depression [Sleep Disturbances] : ~T sleep disturbances

## 2022-03-08 ENCOUNTER — OUTPATIENT (OUTPATIENT)
Dept: OUTPATIENT SERVICES | Facility: HOSPITAL | Age: 57
LOS: 1 days | Discharge: HOME | End: 2022-03-08

## 2022-03-08 DIAGNOSIS — M79.609 PAIN IN UNSPECIFIED LIMB: ICD-10-CM

## 2022-03-09 ENCOUNTER — APPOINTMENT (OUTPATIENT)
Dept: HEMATOLOGY ONCOLOGY | Facility: CLINIC | Age: 57
End: 2022-03-09
Payer: MEDICARE

## 2022-03-09 PROCEDURE — 99214 OFFICE O/P EST MOD 30 MIN: CPT | Mod: 95

## 2022-03-09 RX ORDER — METHADONE HYDROCHLORIDE 5 MG/1
5 TABLET ORAL 3 TIMES DAILY
Qty: 90 | Refills: 0 | Status: DISCONTINUED | COMMUNITY
Start: 2022-02-11 | End: 2022-03-09

## 2022-03-09 NOTE — PHYSICAL EXAM
[de-identified] : Physical Exam (Telemedicine): \par Gen: AAOX3, NAD\par HEENT: PERRLA, EOMI, NCAT, NP\par Pulmonary: No Signs of Respiratory Distress\par MSK: AROM WFL, Limitations painful truncal flexion/ extension\par Neurological: Grossly neurologically intact, Noted deficits none\par Gait: Normal, Non-antalgic, Sans AD\par Derm: No Rash, (-)Lesions, (-)Erythema, (-)Cyanosis \par Psych: Calm, Cooperative, Conversational\par \par Disclaimer: This is a limited examination for the purposes of conducting a telemedicine visit during the COVID-19 pandemic. Physical exam maneuvers were modified as necessary to allow patient to self perform. A focused physician physical examination will be performed during in person visits and should be referred to to determine need for further testing, interventions or degree of disability.

## 2022-03-09 NOTE — ASSESSMENT
[FreeTextEntry1] : Mr. HEMAL PANTOJA is a 56 year M suffering from widespread pain in the distal extremities, that based upon today's subjective complaints, physical examination, and imaging review, is likely multifactorial with significant component secondary to painful diabetic neuropathy and HIV neuropathy\par \par \par >> Medications\par \par I informed patient that the dose of opiates that they use is very high with respect to CDC guidelines.  They use 1215 oral morphine milligram equivalents per day at this time. (390 mgs oxycodone plus methadone 60 mgs)  I told them that this places them at significantly increased risk for medical comorbidities including respiratory compromise, immunodeficiency, constipation, increased sensitivity to pain, and even spontaneous respiratory arrest/ death. Patient verbalizes understanding and stated that without these medications he cannot function whatsoever throughout the day and go about her activities of daily living.\par \par Patient has also been informed of my opinion that this dose can cause a paradoxical sensitivity to pain which is formally described as opiate induced hyperalgesia\par \par Recommend Tapering Total Opiate dose by 15% Monthly beginning with Oxycodone\par \par Consider-  Relistor 150 mg take 3 tabs orally in AM before breakfast - Moving BMs daily at this time\par \par Continue with Pregabalin 150mg BID\par  \par >> Interventions\par  \par Consider for intrathecal pump evaluation\par \par Consider for spinal cord stimulator trial for diabetic polyneuropathy. Patient would need in person visit, which he was offered, unless he seeks a pain physician that is local to him, which is what he prefers.\par  \par >> Therapy and Other Modalities\par \par C/w strict glucose control, daily home stretching regimen\par  \par >> Imaging and Other Studies\par \par None ordered\par \par >> Consults\par \par Had Sleep Study 10 yrs ago, consider repeating assessing for MESSI\par \par None ordered \par \par \par Verbal consent was given by/on: Patient 2/14/22\par \par Patient location: Home [address] [facility]\par Physician location: Office [place] [address] [place]\par Reason for Telehealth visit: \par This service took place using a two way audio and visual platform. The patient and Dr. Swenson were both able to see each other and communicate through video. There were no barriers to communication. Greater then 50% of the time spent in the encounter involved counseling and coordination of care. \par \par Time spent on visit: 30 minutes \par \par \par This visit was provided via telehealth using real-time 2-way audio visual technology. The patient, Hemal Pantoja, was located at Home [address] [place] [address] at the time of the visit. The provider, Anjum Swenson, was located at the medical office located in  at the time of the visit.  The patient, Hemal Pantoja and Provider participated in the telehealth encounter.

## 2022-03-09 NOTE — DATA REVIEWED
[FreeTextEntry1] : [facility] PDMP Checked Reference #: [id number]\par \par Others' Prescriptions\par Patient Name: Compa Chatman Date: 1965\par Address: [address] [place], [place] [address]Ojd: Male

## 2022-03-09 NOTE — CONSULT LETTER
[Dear  ___] : Dear  [unfilled], [Consult Letter:] : I had the pleasure of evaluating your patient, [unfilled]. [Please see my note below.] : Please see my note below. [Consult Closing:] : Thank you very much for allowing me to participate in the care of this patient.  If you have any questions, please do not hesitate to contact me. [Sincerely,] : Sincerely, [FreeTextEntry3] : Anjum Swenson DO

## 2022-03-09 NOTE — HISTORY OF PRESENT ILLNESS
[8] : a current pain level of 8/10 [10] : an average pain level of 10/10 [9] : a minimum pain level of 9/10 [FreeTextEntry1] : Telehealth visit given COVID19 outbreak and precautions. \par \par Patient consented to discussing health information with myself and the MD and understands HIPAA limitations. \par \par HPI - Mr. HEMAL YOUNG is a 56 year M with PHx Chronic High Dose Opiate Therapy for painful diabetic neuropathy and HIV neuropathy (viral load unknown), fibromyalgia, migraines, as below, referred by Dr Kam Krueger who presents today with chief complaint of lower extremity pain. Reports that it developed approximately 20 yrs ago, provided opiates and had doses increased progressively since then. It is located in the back and legs ;  there is radiation of the pain into the feet. The pain is presently 9/10 in severity on the numerical rating scale. It is burning in nature. The pain is constant. There is diurnal worsening, during the night. The pain is aggravated with activity. The pain improves with rest. The pain is functionally and emotionally disabling for the patient as its preventing them from going about activities of daily living, such as routine housework. The pain does impair the patient’s ability to sleep. \par \par Patient has been seen by pain management in the past [place] - Using methadone 60 mg/day in divided doses and oxycodone 30 mg up to 13 tablets/day. In the past he reports being on methadone up to 160 mg/day in divided doses and oxycodone up to 18 tablets of 30 mg/day. Patient has trialed fentanyl and morphine without benefit. Trials of Neurontin 300 mg dosage up to 3000 mg/day and Cymbalta were ineffective in controlling his pain. Patient describes worsening pain when using medical marijuana.\par \par Reports there is present numbness, there is NO weakness. Patient denies any bowel/bladder incontinence, no saddle/perineal anesthesia or any other red flag signs or symptoms. Reports DAILY, regular BMs.

## 2022-03-16 NOTE — ASSESSMENT
[FreeTextEntry1] : 56yoM with:\par \par 1. HIV Neuropathy - Appreciate input from telepain consultant. Will need to likely find a local pain specialist to evaluate for intervention given Dr. Swenson is not geographically near to patient.\par ECG done in office 1/10/22 with QTc of 460ms. \par Recommend increasing methadone to 35mg BID and will work on lowering oxycodone usage.  The goal of tapering the oxycodone was reiterated and patient is in agreement. Will decrease daily pill usage by 1 pill with his next refill.  C/w pregabalin 150mg BID. \par \par 2. HIV - Have previously recommended that patient establish care with ID specialist - [facility] contacted previously by me.  Patient has been having difficulty with physically leaving his house to make it to office visits. \par \par 3. Anxiety/Depression - Patient had been working with his insurance company to find mental health care that is covered. \par \par 4. MESSI - Discussed the importance of seeing a sleep specialist. \par \par 5. Encounter for Palliative Care - Emotional support provided.\par \par Follow up in 1 month, call sooner with questions or issues.

## 2022-03-16 NOTE — HISTORY OF PRESENT ILLNESS
[Home] : at home, [unfilled] , at the time of the visit. [Medical Office: ([facility])___] : at the medical office located in  [Verbal consent obtained from patient] : the patient, [unfilled] [FreeTextEntry1] : 56yoM with HIV (dx 1989 - on Kaletra, Epzicom), chronic peripheral neuropathy being seen for follow up visit via Telemedicine. Patient had been following with Dr. Mario Yeung for many years and is now transferring care to me for pain management. \par PMH also significant for MESSI, ADD, homebound, bipolar d/o, DM2 (recent A1c was 6.9), overactive bladder (on oxybutynin), hypotestosteronemia. \par \par Patient reports that his HIV neuropathy began around 1990. The neuropathy manifests as burning in the soles of his feet that travels up his legs to the level of the thighs. He also reports a numbness of his feet, can't feel the ground when he walks.  He gets intermittent electric shock sensations up his legs.  \par Uses a cane or a walker around his home.  Uses a scooter when he leaves his house. \par Had been on gabapentin, duloxetine in past without improvement of his neuropathy. \par \par Was diagnosed with COVID early February 2021. Was treated with Amoxicillin for a presumed sinus infection. He has been having severe headaches that start in the occiput and extend to the front. The imitrex helps for a short period but the pain recurs after 2 hours. His NP prescribed Fioricet which helps a bit.  Also has been having diarrhea. \par \par \par Interval Hx (3/9/22): \par Patient met with Pain Management Dr. Swenson on 2/14/21.  Recommendations were for in person evaluation for intrathecal pump vs spinal cord stimulator, as well as a taper of the oxycodone. \par \par He has not yet increased the methadone dose to 35mg BID  BID, which may have not been well communicated during our last visit.  He is open to doing so now. Goal remains to taper down oxycodone which patient is well aware of. \par \par Current pain regimen:\par Methadone 30mg BID (prefers BID dosing as he uses it with the Pregabalin)\par Oxycodone 60-90mg every 3-4 hours (uses max of 12 Oxycodone 30mg tablets/day)\par Pregabalin 150mg BID\par \par ROS: \par +R drop foot \par +anxiety - uses alprazolam 2mg twice daily\par +depression - uses fluoxetine daily\par +chronic migraines - have been occurring almost daily; uses imitrex PRN\par +nausea - uses ondansetron daily\par +trouble sleeping - wakes up every hour or so, he believes it is due to MESSI. \par Denies constipation, appetite loss, \par \par He has been trying to use his rolling walker more regularly to avoid accidents due to his foot drop.\par \par Reports that he couldn't tolerate wearing the CPAP mask in the past; has not followed up with sleep specialist. \par \par Patient is single, lives alone. He does have a tenant that comes and goes, doesn't interact much with him. He has an HHA through home care, 5 hours/day, 7 days/week. [facility] \par \par Primary Care: Angeline Webb NP\par \par I-Stop Ref#: [id number]

## 2022-04-07 ENCOUNTER — OUTPATIENT (OUTPATIENT)
Dept: OUTPATIENT SERVICES | Facility: HOSPITAL | Age: 57
LOS: 1 days | Discharge: HOME | End: 2022-04-07

## 2022-04-07 DIAGNOSIS — M79.609 PAIN IN UNSPECIFIED LIMB: ICD-10-CM

## 2022-04-13 ENCOUNTER — APPOINTMENT (OUTPATIENT)
Dept: HEMATOLOGY ONCOLOGY | Facility: CLINIC | Age: 57
End: 2022-04-13
Payer: MEDICARE

## 2022-04-13 PROCEDURE — 99213 OFFICE O/P EST LOW 20 MIN: CPT | Mod: 95

## 2022-04-13 NOTE — HISTORY OF PRESENT ILLNESS
[Home] : at home, [unfilled] , at the time of the visit. [Medical Office: ([facility])___] : at the medical office located in  [Verbal consent obtained from patient] : the patient, [unfilled] [FreeTextEntry1] : 56yoM with HIV (dx 1989 - on Kaletra, Epzicom), chronic peripheral neuropathy being seen for follow up visit via Telemedicine. Patient had been following with Dr. Mario Yeung for many years and is now transferring care to me for pain management. \par PMH also significant for MESSI, ADD, homebound, bipolar d/o, DM2 (recent A1c was 6.9), overactive bladder (on oxybutynin), hypotestosteronemia. \par \par Patient reports that his HIV neuropathy began around 1990. The neuropathy manifests as burning in the soles of his feet that travels up his legs to the level of the thighs. He also reports a numbness of his feet, can't feel the ground when he walks.  He gets intermittent electric shock sensations up his legs.  \montrell Uses a cane or a walker around his home.  Uses a scooter when he leaves his house. \montrell Had been on gabapentin, duloxetine in past without improvement of his neuropathy. \par \montrell Was diagnosed with COVID early February 2021. Was treated with Amoxicillin for a presumed sinus infection. He has been having severe headaches that start in the occiput and extend to the front. The imitrex helps for a short period but the pain recurs after 2 hours. His NP prescribed Fioricet which helps a bit.  Also has been having diarrhea. \par \par Interval Hx (4/13/22): \montrell Was hospitalized at [facility] with LE edema and dyspnea. While there he was found to have a large L renal stone. He will be following up with a Urologist, Nephrologist and Infectious Disease specialist. \par \par He has not yet increased the methadone dose to 35mg BID  BID, which may have not been well communicated during our last visit.  He is open to doing so now. Goal remains to taper down oxycodone which patient is well aware of. \par \par Current pain regimen:\par Methadone 30mg BID (prefers BID dosing as he uses it with the Pregabalin)\par Oxycodone 60-90mg every 3-4 hours (uses max of 12 Oxycodone 30mg tablets/day)\par Pregabalin 150mg BID\par \par ROS: \par +R drop foot \par +anxiety - uses alprazolam 2mg twice daily\par +depression - uses fluoxetine daily\par +chronic migraines - have been occurring almost daily; uses imitrex PRN\par +nausea - uses ondansetron daily\par +trouble sleeping - wakes up every hour or so, he believes it is due to MESSI. \par Denies constipation, appetite loss, \par \par He has been trying to use his rolling walker more regularly to avoid accidents due to his foot drop.\par \par Reports that he couldn't tolerate wearing the CPAP mask in the past; has not followed up with sleep specialist. \par \par Patient is single, lives alone. He does have a tenant that comes and goes, doesn't interact much with him. He has an HHA through home care, 5 hours/day, 7 days/week. Rece[facility] \par \par Primary Care: Angeline Webb NP\par \par I-Stop Ref#: [id number]

## 2022-04-13 NOTE — ASSESSMENT
[FreeTextEntry1] : 56yoM with:\par \par 1. HIV Neuropathy -\par ECG done in office 1/10/22 with QTc of 460ms. \par C/w methadone 40mg BID and will work on lowering oxycodone usage.  The goal of tapering the oxycodone was reiterated and patient is in agreement. Will decrease daily pill usage by 1 pill with his next refill.  C/w pregabalin 150mg BID. \par \par 2. HIV - Have previously recommended that patient establish care with ID specialist - [facility] been contacted previously by me.  Patient has been having difficulty with physically leaving his house to make it to office visits. Patient states he will be establishing a [facility] ID specialist. \par \par 3. Anxiety/Depression - Patient had been working with his insurance company to find mental health care that is covered. \par \par 4. MESSI - Discussed the importance of seeing a sleep specialist. \par \par 5. Encounter for Palliative Care - Emotional support provided.\par \par Follow up in 1 month, call sooner with questions or issues.

## 2022-06-06 ENCOUNTER — OUTPATIENT (OUTPATIENT)
Dept: OUTPATIENT SERVICES | Facility: HOSPITAL | Age: 57
LOS: 1 days | Discharge: HOME | End: 2022-06-06

## 2022-06-06 DIAGNOSIS — M79.609 PAIN IN UNSPECIFIED LIMB: ICD-10-CM

## 2022-06-13 ENCOUNTER — APPOINTMENT (OUTPATIENT)
Dept: HEMATOLOGY ONCOLOGY | Facility: CLINIC | Age: 57
End: 2022-06-13
Payer: MEDICARE

## 2022-06-13 PROCEDURE — 99213 OFFICE O/P EST LOW 20 MIN: CPT | Mod: 95

## 2022-06-13 NOTE — ASSESSMENT
[FreeTextEntry1] : 56yoM with:\par \par 1. HIV Neuropathy -\par ECG done in office 1/10/22 with QTc of 460ms. \par C/w methadone 40mg BID and he will continue to work on lowering oxycodone usage.  The goal of tapering the oxycodone was reiterated and patient is in agreement. C/w pregabalin 150mg BID. \par \par 2. HIV - Have previously recommended that patient establish care with ID specialist - [facility] been contacted previously by me.  Patient has been having difficulty with physically leaving his house to make it to office visits. Patient states [facility] will be [facility]. \par \par 3. Anxiety/Depression - Patient had been working with his insurance company to find mental health care that is covered. \par \par 4. MESSI - Discussed the importance of seeing a sleep specialist. \par \par 5. Encounter for Palliative Care - Emotional support provided.\par \par Follow up in 1 month, call sooner with questions or issues.

## 2022-06-13 NOTE — HISTORY OF PRESENT ILLNESS
[Home] : at home, [unfilled] , at the time of the visit. [Medical Office: ([facility])___] : at the medical office located in  [Verbal consent obtained from patient] : the patient, [unfilled] [FreeTextEntry1] : 56yoM with HIV (dx 1989 - on Kaletra, Epzicom), chronic peripheral neuropathy being seen for follow up visit via Telemedicine. Patient had been following with Dr. Mario Yeung for many years and is now transferring care to me for pain management. \par PMH also significant for MESSI, ADD, homebound, bipolar d/o, DM2 (recent A1c was 6.9), overactive bladder (on oxybutynin), hypotestosteronemia. \par \par Patient reports that his HIV neuropathy began around 1990. The neuropathy manifests as burning in the soles of his feet that travels up his legs to the level of the thighs. He also reports a numbness of his feet, can't feel the ground when he walks.  He gets intermittent electric shock sensations up his legs.  \montrell Uses a cane or a walker around his home.  Uses a scooter when he leaves his house. \montrell Had been on gabapentin, duloxetine in past without improvement of his neuropathy. \par gilles Was diagnosed with COVID early February 2021. Was treated with Amoxicillin for a presumed sinus infection. He has been having severe headaches that start in the occiput and extend to the front. The imitrex helps for a short period but the pain recurs after 2 hours. His NP prescribed Fioricet which helps a bit.  Also has been having diarrhea. \par gilles Was hospitalized at [facility] with LE edema and dyspnea 4/2022. While there he was found to have a large L renal stone. He will be following up with a Urologist, Nephrologist and Infectious Disease specialist. \par \par Interval Hx (6/13/22): \par Visit done via telemedicine. \par Pt reports that his RLE has had an increase in swelling over the last 2 weeks, has gone for RLE doppler, results pending. \montrell Has gained a lot of weight in the last year, he knows this is exacerbating his LE pain. \par He is working with a  who will be helping to connect him with various specialists, including a neurologist, in the coming months. \par \par Current pain regimen:\par Methadone 40mg BID (prefers BID dosing as he uses it with the Pregabalin)\par Oxycodone 60mg every 3-4 hours (uses max of 10 Oxycodone 30mg tablets/day)\par Pregabalin 150mg BID\par \par ROS: \par +R drop foot \par +anxiety - uses alprazolam 2mg twice daily\par +depression - uses fluoxetine daily\par +chronic migraines - have been occurring almost daily; uses imitrex PRN\par +nausea - uses ondansetron daily\par +trouble sleeping - wakes up every hour or so, he believes it is due to MESSI. \par Denies constipation, appetite loss, \par \par He has been trying to use his rolling walker more regularly to avoid accidents due to his foot drop.\par \par Reports that he couldn't tolerate wearing the CPAP mask in the past; has not followed up with sleep specialist. \par \par Patient is single, lives alone. He does have a tenant that comes and goes, doesn't interact much with him. He has an HHA through home care, 5 hours/day, 7 days/week. Receives [facility]-term [facility] care services through [facility]. \par \par Primary Care: Angeline Webb NP\par \par I-Stop Ref#: [id number]

## 2022-08-10 ENCOUNTER — FORM ENCOUNTER (OUTPATIENT)
Age: 57
End: 2022-08-10

## 2022-08-10 ENCOUNTER — NON-APPOINTMENT (OUTPATIENT)
Age: 57
End: 2022-08-10

## 2022-08-11 ENCOUNTER — LABORATORY RESULT (OUTPATIENT)
Age: 57
End: 2022-08-11

## 2022-08-11 ENCOUNTER — APPOINTMENT (OUTPATIENT)
Dept: INFECTIOUS DISEASE | Facility: CLINIC | Age: 57
End: 2022-08-11

## 2022-08-11 VITALS
BODY MASS INDEX: 40.43 KG/M2 | OXYGEN SATURATION: 96 % | HEART RATE: 80 BPM | DIASTOLIC BLOOD PRESSURE: 91 MMHG | SYSTOLIC BLOOD PRESSURE: 137 MMHG | WEIGHT: 315 LBS | HEIGHT: 74 IN | TEMPERATURE: 96.8 F

## 2022-08-11 DIAGNOSIS — F41.9 ANXIETY DISORDER, UNSPECIFIED: ICD-10-CM

## 2022-08-11 DIAGNOSIS — F33.0 MAJOR DEPRESSIVE DISORDER, RECURRENT, MILD: ICD-10-CM

## 2022-08-11 PROCEDURE — 99204 OFFICE O/P NEW MOD 45 MIN: CPT

## 2022-08-11 PROCEDURE — 90834 PSYTX W PT 45 MINUTES: CPT

## 2022-08-11 RX ORDER — ALCOHOL ANTISEPTIC PADS
70 PADS, MEDICATED (EA) TOPICAL
Qty: 100 | Refills: 0 | Status: COMPLETED | COMMUNITY
Start: 2022-04-09

## 2022-08-11 RX ORDER — GLUCOSA SU 2KCL/CHONDROITIN SU 500-400 MG
500 CAPSULE ORAL
Qty: 30 | Refills: 0 | Status: ACTIVE | COMMUNITY
Start: 2022-05-07

## 2022-08-11 RX ORDER — CLONIDINE HYDROCHLORIDE 0.1 MG/1
0.1 TABLET ORAL
Qty: 3 | Refills: 0 | Status: COMPLETED | COMMUNITY
Start: 2022-07-10

## 2022-08-11 RX ORDER — FINASTERIDE 5 MG/1
5 TABLET, FILM COATED ORAL
Qty: 30 | Refills: 0 | Status: ACTIVE | COMMUNITY
Start: 2022-05-07

## 2022-08-11 RX ORDER — PROPRANOLOL HYDROCHLORIDE 20 MG/1
20 TABLET ORAL
Qty: 60 | Refills: 0 | Status: COMPLETED | COMMUNITY
Start: 2022-05-07

## 2022-08-11 RX ORDER — LISINOPRIL 20 MG/1
20 TABLET ORAL
Qty: 30 | Refills: 0 | Status: COMPLETED | COMMUNITY
Start: 2022-05-11

## 2022-08-11 RX ORDER — NYSTATIN 100000 [USP'U]/G
100000 CREAM TOPICAL
Qty: 30 | Refills: 0 | Status: ACTIVE | COMMUNITY
Start: 2022-07-23

## 2022-08-11 RX ORDER — SUMATRIPTAN SUCCINATE 4 MG/.5ML
4 INJECTION SUBCUTANEOUS TWICE DAILY
Refills: 0 | Status: ACTIVE | COMMUNITY
Start: 2022-08-11

## 2022-08-11 RX ORDER — LINAGLIPTIN 5 MG/1
5 TABLET, FILM COATED ORAL
Qty: 30 | Refills: 0 | Status: ACTIVE | COMMUNITY
Start: 2022-07-22

## 2022-08-11 RX ORDER — FENOFIBRATE 160 MG/1
160 TABLET ORAL
Qty: 30 | Refills: 0 | Status: COMPLETED | COMMUNITY
Start: 2022-05-07

## 2022-08-11 RX ORDER — DOCUSATE SODIUM 100 MG/1
100 CAPSULE, LIQUID FILLED ORAL
Qty: 60 | Refills: 0 | Status: ACTIVE | COMMUNITY
Start: 2022-05-07

## 2022-08-11 RX ORDER — PROPRANOLOL HYDROCHLORIDE 20 MG/1
20 TABLET ORAL TWICE DAILY
Refills: 0 | Status: ACTIVE | COMMUNITY
Start: 2022-08-11

## 2022-08-11 RX ORDER — LISINOPRIL 10 MG/1
10 TABLET ORAL
Qty: 30 | Refills: 0 | Status: ACTIVE | COMMUNITY
Start: 2022-05-07

## 2022-08-11 RX ORDER — CHLORHEXIDINE GLUCONATE 4 %
400 (240 MG) LIQUID (ML) TOPICAL
Qty: 60 | Refills: 0 | Status: ACTIVE | COMMUNITY
Start: 2022-05-07

## 2022-08-11 RX ORDER — FUROSEMIDE 80 MG/1
80 TABLET ORAL
Qty: 10 | Refills: 0 | Status: COMPLETED | COMMUNITY
Start: 2022-07-16

## 2022-08-11 RX ORDER — FUROSEMIDE 20 MG/1
20 TABLET ORAL
Qty: 30 | Refills: 0 | Status: COMPLETED | COMMUNITY
Start: 2022-03-21

## 2022-08-11 RX ORDER — ASCORBIC ACID 500 MG
500 TABLET ORAL
Qty: 30 | Refills: 0 | Status: ACTIVE | COMMUNITY
Start: 2022-05-07

## 2022-08-11 RX ORDER — ONDANSETRON 8 MG/1
8 TABLET, ORALLY DISINTEGRATING ORAL
Qty: 60 | Refills: 1 | Status: ACTIVE | COMMUNITY
Start: 2022-08-11

## 2022-08-11 RX ORDER — ALBUTEROL SULFATE 90 UG/1
108 (90 BASE) INHALANT RESPIRATORY (INHALATION)
Qty: 7 | Refills: 0 | Status: ACTIVE | COMMUNITY
Start: 2022-03-22

## 2022-08-11 RX ORDER — SUMATRIPTAN SUCCINATE 6 MG/.5ML
6 INJECTION, SOLUTION SUBCUTANEOUS
Qty: 6 | Refills: 0 | Status: COMPLETED | COMMUNITY
Start: 2022-06-03

## 2022-08-11 RX ORDER — FENOFIBRATE 160 MG/1
160 TABLET ORAL DAILY
Refills: 0 | Status: ACTIVE | COMMUNITY
Start: 2022-08-11

## 2022-08-11 RX ORDER — FOLIC ACID 1 MG/1
1 TABLET ORAL
Qty: 30 | Refills: 0 | Status: ACTIVE | COMMUNITY
Start: 2022-05-07

## 2022-08-11 RX ORDER — OXYBUTYNIN CHLORIDE 5 MG/1
5 TABLET ORAL TWICE DAILY
Refills: 0 | Status: ACTIVE | COMMUNITY
Start: 2022-08-11

## 2022-08-11 RX ORDER — TAMSULOSIN HYDROCHLORIDE 0.4 MG/1
0.4 CAPSULE ORAL
Qty: 30 | Refills: 0 | Status: ACTIVE | COMMUNITY
Start: 2022-08-11

## 2022-08-11 RX ORDER — ERGOCALCIFEROL 1.25 MG/1
1.25 MG CAPSULE, LIQUID FILLED ORAL
Qty: 4 | Refills: 0 | Status: COMPLETED | COMMUNITY
Start: 2022-06-03

## 2022-08-11 NOTE — REVIEW OF SYSTEMS
[Body Aches] : body aches [Difficulty Sleeping] : difficulty sleeping [Feeling Tired] : feeling tired [Eyesight Problems] : eyesight problems [Loss Of Hearing] : hearing loss [Palpitations] : palpitations [Lower Ext Edema] : lower extremity edema [Shortness Of Breath] : shortness of breath [SOB on Exertion] : shortness of breath during exertion [As Noted in HPI] : as noted in HPI [Testicular Pain] : testicular pain [Joint Pain] : joint pain [Confused] : confusion [Dizziness] : dizziness [Limb Weakness] : limb weakness [Anxiety] : anxiety [Depression] : depression [Negative] : Heme/Lymph [Convulsions] : no convulsions [Suicidal] : not suicidal [FreeTextEntry3] : blurry vision [FreeTextEntry5] : R>L leg inflammation.  Last cardiology apt 2018 [FreeTextEntry7] : nausea [FreeTextEntry8] : Urology consulted 3/2022 while hospitalized  [de-identified] : psoriasis on scalp and face  [de-identified] : needs psych appt

## 2022-08-11 NOTE — PHYSICAL EXAM
[General Appearance - Alert] : alert [General Appearance - Well Nourished] : well nourished [Sclera] : the sclera and conjunctiva were normal [PERRL With Normal Accommodation] : pupils were equal in size, round, reactive to light [Extraocular Movements] : extraocular movements were intact [Outer Ear] : the ears and nose were normal in appearance [Examination Of The Oral Cavity] : the lips and gums were normal [Both Tympanic Membranes Were Examined] : both tympanic membranes were normal [Oropharynx] : the oropharynx was normal with no thrush [Neck Appearance] : the appearance of the neck was normal [Neck Cervical Mass (___cm)] : no neck mass was observed [Jugular Venous Distention Increased] : there was no jugular-venous distention [Thyroid Diffuse Enlargement] : the thyroid was not enlarged [Exaggerated Use Of Accessory Muscles For Inspiration] : no accessory muscle use [Auscultation Breath Sounds / Voice Sounds] : lungs were clear to auscultation bilaterally [Heart Rate And Rhythm] : heart rate was normal and rhythm regular [Heart Sounds] : normal S1 and S2 [Heart Sounds Gallop] : no gallops [Murmurs] : no murmurs [Heart Sounds Pericardial Friction Rub] : no pericardial rub [Bowel Sounds] : normal bowel sounds [Abdomen Soft] : soft [Abdomen Tenderness] : non-tender [Costovertebral Angle Tenderness] : no CVA tenderness [No Palpable Adenopathy] : no palpable adenopathy [Musculoskeletal - Swelling] : no joint swelling [Nail Clubbing] : no clubbing  or cyanosis of the fingernails [] : no rash [Skin Lesions] : no skin lesions [Cranial Nerves] : cranial nerves 2-12 were intact [Sensation] : the sensory exam was normal to light touch and pinprick [No Focal Deficits] : no focal deficits [FreeTextEntry1] : slightly tearful with recounting limitations,  easily fatigued.   [Oriented To Time, Place, And Person] : oriented to person, place, and time [Affect] : the affect was normal

## 2022-08-11 NOTE — ASSESSMENT
[FreeTextEntry1] : HIV return to care  [Treatment Education] : treatment education [Treatment Adherence] : treatment adherence [Medical Care Issues] : medical care issues

## 2022-08-11 NOTE — HISTORY OF PRESENT ILLNESS
[FreeTextEntry1] : 57 yo male here for HIV annual consult / transfer of care\par pt takes Kaletra and Epzicom daily with occasional missed dose \par noted with nausea from medications \par HIV was being managed by PCP.  \par \par \par From previous consult 12/7/2015 : \par 50M HIV+ diagnosed 2002. Currently on ARV (epzicom/kaletra). Last visit was a year and a half ago. Since his last visit, he became very depressed, broke up with his long-time boyfriend. No suicidal or homicidal ideation. Otherwise, regularly follows up with his PCP and pain specialist. Of late, he has been having more back pain and is under the care of his PCP who ordered an MRI of his spine - he has not yet had it done. Due for eye exam and dental f/u. He is supposed to get all of his teeth pulled and dentures.\par \par PMHx: DM, ADD/Bipolar disorder, neuropathy (sees dr. Walsh for pain management), BPH, spinal stenosis.\par \par \par \par 8/11/2022 Plan \par HIV \par Pt interested in simplifying regimen.  \par 1. continue current treatment - refills given.  Pending genosure archive results to simplify regimen.  \par 2. do blood work \par 3. f/u via telemed in one month and 6 months.  Pt with difficulty with mobility and agreed to in person visits once per year.  \par \par Hearing loss \par 1. referral to ENT given \par \par HTN\par 1. referral to cardiology given\par \par Anxiety/ Depression. \par 1. referral to SW given.  \par

## 2022-08-12 ENCOUNTER — LABORATORY RESULT (OUTPATIENT)
Age: 57
End: 2022-08-12

## 2022-08-12 LAB
25(OH)D3 SERPL-MCNC: 45.9 NG/ML
ALBUMIN SERPL ELPH-MCNC: 4.5 G/DL
ALP BLD-CCNC: 49 U/L
ALT SERPL-CCNC: 17 U/L
ANION GAP SERPL CALC-SCNC: 12 MMOL/L
AST SERPL-CCNC: 15 U/L
BASOPHILS # BLD AUTO: 0.04 K/UL
BASOPHILS NFR BLD AUTO: 0.6 %
BILIRUB SERPL-MCNC: 0.2 MG/DL
BUN SERPL-MCNC: 18 MG/DL
C TRACH RRNA SPEC QL NAA+PROBE: NOT DETECTED
CALCIUM SERPL-MCNC: 10.1 MG/DL
CD3 CELLS # BLD: 1213 /UL
CD3 CELLS NFR BLD: 77 %
CD3+CD4+ CELLS # BLD: 535 /UL
CD3+CD4+ CELLS NFR BLD: 34 %
CD3+CD4+ CELLS/CD3+CD8+ CLL SPEC: 0.82 RATIO
CD3+CD8+ CELLS # SPEC: 650 /UL
CD3+CD8+ CELLS NFR BLD: 41 %
CHLORIDE SERPL-SCNC: 100 MMOL/L
CHOLEST SERPL-MCNC: 241 MG/DL
CO2 SERPL-SCNC: 32 MMOL/L
CREAT SERPL-MCNC: 1.66 MG/DL
EGFR: 48 ML/MIN/1.73M2
EOSINOPHIL # BLD AUTO: 0.1 K/UL
EOSINOPHIL NFR BLD AUTO: 1.5 %
ESTIMATED AVERAGE GLUCOSE: 203 MG/DL
GLUCOSE SERPL-MCNC: 108 MG/DL
HBA1C MFR BLD HPLC: 8.7 %
HCT VFR BLD CALC: 40.4 %
HCV AB SER QL: NONREACTIVE
HCV S/CO RATIO: 0.29 S/CO
HDLC SERPL-MCNC: 64 MG/DL
HEPATITIS A IGG ANTIBODY: REACTIVE
HGB BLD-MCNC: 12.1 G/DL
IMM GRANULOCYTES NFR BLD AUTO: 0.8 %
LDLC SERPL CALC-MCNC: 136 MG/DL
LYMPHOCYTES # BLD AUTO: 1.64 K/UL
LYMPHOCYTES NFR BLD AUTO: 25.3 %
MAN DIFF?: NORMAL
MCHC RBC-ENTMCNC: 28.9 PG
MCHC RBC-ENTMCNC: 30 GM/DL
MCV RBC AUTO: 96.4 FL
MONOCYTES # BLD AUTO: 0.77 K/UL
MONOCYTES NFR BLD AUTO: 11.9 %
N GONORRHOEA RRNA SPEC QL NAA+PROBE: NOT DETECTED
NEUTROPHILS # BLD AUTO: 3.87 K/UL
NEUTROPHILS NFR BLD AUTO: 59.9 %
NONHDLC SERPL-MCNC: 178 MG/DL
PLATELET # BLD AUTO: 269 K/UL
POTASSIUM SERPL-SCNC: 4.7 MMOL/L
PROT SERPL-MCNC: 7.2 G/DL
PSA SERPL-MCNC: 0.02 NG/ML
RBC # BLD: 4.19 M/UL
RBC # FLD: 14.9 %
SODIUM SERPL-SCNC: 144 MMOL/L
SOURCE AMPLIFICATION: NORMAL
T PALLIDUM AB SER QL IA: NEGATIVE
TRIGL SERPL-MCNC: 207 MG/DL
TSH SERPL-ACNC: 0.8 UIU/ML
WBC # FLD AUTO: 6.47 K/UL

## 2022-08-15 ENCOUNTER — NON-APPOINTMENT (OUTPATIENT)
Age: 57
End: 2022-08-15

## 2022-08-15 DIAGNOSIS — R31.9 HEMATURIA, UNSPECIFIED: ICD-10-CM

## 2022-08-15 LAB
APPEARANCE: ABNORMAL
BILIRUBIN URINE: NEGATIVE
BLOOD URINE: ABNORMAL
COLOR: ABNORMAL
GLUCOSE QUALITATIVE U: NEGATIVE
HIV1 RNA # SERPL NAA+PROBE: NORMAL
HIV1 RNA # SERPL NAA+PROBE: NORMAL COPIES/ML
KETONES URINE: NEGATIVE
LEUKOCYTE ESTERASE URINE: ABNORMAL
MEV IGG FLD QL IA: >300 AU/ML
MEV IGG+IGM SER-IMP: POSITIVE
MUV AB SER-ACNC: POSITIVE
MUV IGG SER QL IA: 11.5 AU/ML
NITRITE URINE: NEGATIVE
PH URINE: 6.5
PROTEIN URINE: ABNORMAL
RUBV IGG FLD-ACNC: 1.1 INDEX
RUBV IGG SER-IMP: POSITIVE
SPECIFIC GRAVITY URINE: 1.02
UROBILINOGEN URINE: NORMAL
VIRAL LOAD INTERP: NORMAL
VIRAL LOAD LOG: NORMAL LG COP/ML
VZV AB TITR SER: NEGATIVE
VZV IGG SER IF-ACNC: 85.8 INDEX

## 2022-08-16 LAB
M TB IFN-G BLD-IMP: NEGATIVE
QUANTIFERON TB PLUS MITOGEN MINUS NIL: >10 IU/ML
QUANTIFERON TB PLUS NIL: 0.03 IU/ML
QUANTIFERON TB PLUS TB1 MINUS NIL: -0.01 IU/ML
QUANTIFERON TB PLUS TB2 MINUS NIL: 0 IU/ML

## 2022-08-19 LAB — HLA-B*57:01 QL: NEGATIVE

## 2022-08-23 ENCOUNTER — NON-APPOINTMENT (OUTPATIENT)
Age: 57
End: 2022-08-23

## 2022-08-29 ENCOUNTER — NON-APPOINTMENT (OUTPATIENT)
Age: 57
End: 2022-08-29

## 2022-09-02 ENCOUNTER — NON-APPOINTMENT (OUTPATIENT)
Age: 57
End: 2022-09-02

## 2022-09-06 ENCOUNTER — NON-APPOINTMENT (OUTPATIENT)
Age: 57
End: 2022-09-06

## 2022-09-06 ENCOUNTER — OUTPATIENT (OUTPATIENT)
Dept: OUTPATIENT SERVICES | Facility: HOSPITAL | Age: 57
LOS: 1 days | Discharge: HOME | End: 2022-09-06

## 2022-09-06 DIAGNOSIS — M79.609 PAIN IN UNSPECIFIED LIMB: ICD-10-CM

## 2022-09-07 ENCOUNTER — APPOINTMENT (OUTPATIENT)
Dept: HEMATOLOGY ONCOLOGY | Facility: CLINIC | Age: 57
End: 2022-09-07

## 2022-09-07 DIAGNOSIS — F32.A DEPRESSION, UNSPECIFIED: ICD-10-CM

## 2022-09-07 LAB
HIV GENOSURE ARCHIVE 1: NORMAL
HIV1 PROVIR DNA RT + PR + IN MUT DET SEQ: NORMAL
HIV1 PROVIRAL DNA GENTYP BLD MC NAR: NORMAL

## 2022-09-07 PROCEDURE — 99213 OFFICE O/P EST LOW 20 MIN: CPT | Mod: 95

## 2022-09-07 NOTE — HISTORY OF PRESENT ILLNESS
[Home] : at home, [unfilled] , at the time of the visit. [Medical Office: ([facility])___] : at the medical office located in  [Verbal consent obtained from patient] : the patient, [unfilled] [FreeTextEntry1] : 56yoM with HIV (dx 1989 - on Kaletra, Epzicom), chronic peripheral neuropathy being seen for follow up visit via Telemedicine. Patient had been following with Dr. Mario Yeung for many years and is now transferring care to me for pain management. \par PMH also significant for MESSI, ADD, homebound, bipolar d/o, DM2 (recent A1c was 6.9), overactive bladder (on oxybutynin), hypotestosteronemia. \par \par Patient reports that his HIV neuropathy began around 1990. The neuropathy manifests as burning in the soles of his feet that travels up his legs to the level of the thighs. He also reports a numbness of his feet, can't feel the ground when he walks.  He gets intermittent electric shock sensations up his legs.  \par Uses a cane or a walker around his home.  Uses a scooter when he leaves his house. \par Had been on gabapentin, duloxetine in past without improvement of his neuropathy. \par \montrell Was diagnosed with COVID early February 2021. Was treated with Amoxicillin for a presumed sinus infection. He has been having severe headaches that start in the occiput and extend to the front. The imitrex helps for a short period but the pain recurs after 2 hours. His NP prescribed Fioricet which helps a bit.  Also has been having diarrhea. \par \montrell Was hospitalized at [facility] with LE edema and dyspnea 4/2022. While there he was found to have a large L renal stone. He will be following up with a Urologist, Nephrologist and Infectious Disease specialist. \par \par Interval Hx (9/7/22): \par Visit done via telemedicine. \par Since our last visit pt has established with Infectious Diseases. \par Patient reports that his LE swelling  that exacerbates his pain. \par Admits that the lowered amount of oxycodone has been difficult for him to adjust to, pain is worse. \par \par Current pain regimen:\par Methadone 40mg BID (prefers BID dosing as he uses it with the Pregabalin)\par Oxycodone 60mg every 3-4 hours (uses max of 10 Oxycodone 30mg tablets/day)\par Pregabalin 150mg BID\par \par ROS: \par +R drop foot \par +anxiety - uses alprazolam 2mg twice daily\par +depression - uses fluoxetine daily\par +chronic migraines - have been occurring almost daily; uses imitrex PRN\par +nausea - uses ondansetron daily\par +trouble sleeping - wakes up every hour or so, he believes it is due to MESSI. \par Denies constipation, appetite loss, \par \par He has been trying to use his rolling walker more regularly to avoid accidents due to his foot drop.\par \par Reports that he couldn't tolerate wearing the CPAP mask in the past; has not followed up with sleep specialist. \par \par Patient is single, lives alone. He does have a tenant that comes and goes, doesn't interact much with him. He has an HHA through home care, 5 hours/day, 7 days/week. Receives long-term home care services through [facility]. \par \par Primary Care: Angeline Webb NP\par \par I-Stop Ref#: [id number]

## 2022-09-07 NOTE — ASSESSMENT
[FreeTextEntry1] : 56yoM with:\par \par 1. HIV Neuropathy -\par ECG done in office 1/10/22 with QTc of 460ms. Pt reports he recently had an ECG done at home. Requested that it be sent to my office before I can make any adjustment to methadone dose.\par C/w methadone 40mg BID and he will continue to work on lowering oxycodone usage, at this point he's reached a plateau given the rise in pain he's experienced. C/w pregabalin 150mg BID. \par \par 2. HIV - Patient has established care with [facility] Infectious Disease office. \par \par 3. Anxiety/Depression - Patient needs to establish with psychiatry. \par \par 4. MESSI - Discussed the importance of seeing a sleep specialist. \par \par 5. Encounter for Palliative Care - Emotional support provided.\par \par Follow up in 2 months, call sooner with questions or issues.

## 2022-09-14 ENCOUNTER — NON-APPOINTMENT (OUTPATIENT)
Age: 57
End: 2022-09-14

## 2022-09-15 ENCOUNTER — NON-APPOINTMENT (OUTPATIENT)
Age: 57
End: 2022-09-15

## 2022-09-20 ENCOUNTER — APPOINTMENT (OUTPATIENT)
Dept: INFECTIOUS DISEASE | Facility: CLINIC | Age: 57
End: 2022-09-20

## 2022-09-20 PROCEDURE — 99443: CPT

## 2022-09-23 ENCOUNTER — NON-APPOINTMENT (OUTPATIENT)
Age: 57
End: 2022-09-23

## 2022-10-03 ENCOUNTER — NON-APPOINTMENT (OUTPATIENT)
Age: 57
End: 2022-10-03

## 2022-10-25 ENCOUNTER — APPOINTMENT (OUTPATIENT)
Dept: INFECTIOUS DISEASE | Facility: CLINIC | Age: 57
End: 2022-10-25

## 2022-10-25 PROCEDURE — 99443: CPT

## 2022-10-27 ENCOUNTER — NON-APPOINTMENT (OUTPATIENT)
Age: 57
End: 2022-10-27

## 2022-11-01 ENCOUNTER — APPOINTMENT (OUTPATIENT)
Dept: INFECTIOUS DISEASE | Facility: CLINIC | Age: 57
End: 2022-11-01

## 2022-11-01 PROCEDURE — 99443: CPT

## 2022-11-04 ENCOUNTER — NON-APPOINTMENT (OUTPATIENT)
Age: 57
End: 2022-11-04

## 2022-11-16 ENCOUNTER — APPOINTMENT (OUTPATIENT)
Dept: PSYCHIATRY | Facility: CLINIC | Age: 57
End: 2022-11-16

## 2022-11-16 PROCEDURE — 99204 OFFICE O/P NEW MOD 45 MIN: CPT | Mod: 95

## 2022-11-17 NOTE — PHYSICAL EXAM
[FreeTextEntry5] : deferred, virtual fprmat [Cooperative] : cooperative [Euthymic] : euthymic [Full] : full [Clear] : clear [Linear/Goal Directed] : linear/goal directed [Average] : average [WNL] : within normal limits

## 2022-11-17 NOTE — REASON FOR VISIT
[Specialty Physician] : Specialty Physician [[facility] Provider/Facility] : [facility] Provider/Facility [Patient] : Patient [FreeTextEntry2] : chronic pain [FreeTextEntry1] : chronic pain

## 2022-11-17 NOTE — DISCUSSION/SUMMARY
[FreeTextEntry1] : Pt is a 57 yr old male with PMH HIV (since late 1980's, well-controlled, on HAART), anxiety, and chronic peripheral neuropathy (HIV-related), formerly in care with Dr. Yeung (pain mgmt) followed by Dr. Hawley (palliative). Referred to my practice for continuation of chronic pain treatment/opioid management. The patient suffers from chronic, debilitating neuropathy and has been partially-controlled with chronic opioid therapy without evidence of severe side effects or addiction/misuse. As such, the benefits from continuing chronic opioid therapy currently outweigh the risks with appropriate monitoring. \par \par Plan: \par - Continue Methadone 10mg 4 tabs in AM and 4 tabs in PM; Rx refilled\par - Continue Oxycodone 30mg 2 tabs 3-5/day; Rx refilled\par - Continue Lyrica 150mg BID; Rx refilled. \par - Will renew medications in 30 days\par - Will have virtual f/u with patient in 60 days.

## 2022-11-17 NOTE — HISTORY OF PRESENT ILLNESS
[FreeTextEntry1] : Pt is a 57 yr old male with PMH HIV (since late 1980's, well-controlled, on HAART), anxiety, and chronic peripheral neuropathy (HIV-related), formerly in care with Dr. Yeung (pain mgmt) followed by Dr. Hawley (palliative). Referred to my practice for continuation of chronic pain treatment/opioid management. \par \par Chart reviewed beforehand. Pt seen virtually. Pleasant, cooperative, AAO X3 with no overt confusion, neurological deficits, or distress. We reviewed his chronic pain hx (emanating from his HIV x decades, described as a neuropathy in b/l LE c/b diabetes). Sx include constant b/l LE pain (particularly in his feet), temperature fluctuations, allodynia/sensitivity, and parasthesias (electric sensations, burning, "like frostbite," etc). Over the years he has been managed with a variety of medications (Actiq, Cymbalta, GBP) but settled on Methadone, Oxycodone, and Lyrica x years. He feels this combination offers him some degree of functioning. He denies side effects from this regimen. He currently walks with a cane/walker, but is largely housebound. Psychologically, he ajay by accepting the chronicity of his pain, though says a large reason for not leaving his home is tied to depression stemming from a breakup last year (however, he denies any severe psychopathology such as hopelessness, SI, HI, psychosis, or krystin). He does take Xanax regularly which is to be prescribed by Dr. Won Gonzalez in the HIV clinic.\par \par

## 2022-11-22 ENCOUNTER — APPOINTMENT (OUTPATIENT)
Dept: INFECTIOUS DISEASE | Facility: CLINIC | Age: 57
End: 2022-11-22

## 2022-11-22 PROCEDURE — 99443: CPT

## 2022-11-22 RX ORDER — FLUOXETINE HYDROCHLORIDE 40 MG/1
40 CAPSULE ORAL DAILY
Refills: 0 | Status: DISCONTINUED | COMMUNITY
Start: 2022-08-11 | End: 2022-11-22

## 2022-11-28 ENCOUNTER — NON-APPOINTMENT (OUTPATIENT)
Age: 57
End: 2022-11-28

## 2022-12-09 ENCOUNTER — RX RENEWAL (OUTPATIENT)
Age: 57
End: 2022-12-09

## 2022-12-12 ENCOUNTER — RX RENEWAL (OUTPATIENT)
Age: 57
End: 2022-12-12

## 2022-12-27 ENCOUNTER — APPOINTMENT (OUTPATIENT)
Dept: INFECTIOUS DISEASE | Facility: CLINIC | Age: 57
End: 2022-12-27

## 2022-12-27 ENCOUNTER — NON-APPOINTMENT (OUTPATIENT)
Age: 57
End: 2022-12-27

## 2023-01-10 ENCOUNTER — APPOINTMENT (OUTPATIENT)
Dept: INFECTIOUS DISEASE | Facility: CLINIC | Age: 58
End: 2023-01-10
Payer: MEDICARE

## 2023-01-10 PROCEDURE — 99442: CPT

## 2023-01-11 ENCOUNTER — APPOINTMENT (OUTPATIENT)
Dept: PSYCHIATRY | Facility: CLINIC | Age: 58
End: 2023-01-11
Payer: MEDICARE

## 2023-01-11 PROCEDURE — 99214 OFFICE O/P EST MOD 30 MIN: CPT | Mod: 95

## 2023-01-12 NOTE — REVIEW OF SYSTEMS
[Negative] : Allergic/Immunologic [FreeTextEntry2] : general/vague weakness [FreeTextEntry6] : on O2, chronic [FreeTextEntry7] : constipation, resolving [de-identified] : chronic neuropathic pain [de-identified] : anxiety/depression related to recent losses/stressors; no SI/HI

## 2023-01-12 NOTE — RISK ASSESSMENT
[No, patient denies ideation or behavior] : No, patient denies ideation or behavior [Low acute suicide risk] : Low acute suicide risk

## 2023-01-12 NOTE — REASON FOR VISIT
[Access issues (e.g., transportation, impaired mobility, etc.)] : due to patient's access issues [Medical Office: ([facility])___] : The provider was located at the medical office in [unfilled]. [Home] : The patient, [unfilled], was located at home, [unfilled], at the time of the visit. [Patient] : Patient [FreeTextEntry1] : chronic pain

## 2023-01-12 NOTE — HISTORY OF PRESENT ILLNESS
[FreeTextEntry1] : Pt is a 57 yr old male with PMH HIV (since late 1980's, well-controlled, on HAART), anxiety, and chronic peripheral neuropathy (HIV-related), formerly in care with Dr. Yeung (pain mgmt) followed by Dr. Hawley (palliative). Referred to my practice for continuation of chronic pain treatment/opioid management. \par \par Interval Hx: Chart reviewed beforehand. Pt seen virtually. Pleasant, cooperative, AAO X3 with no overt confusion, neurological deficits, or distress. However has NC and icepack over head. Reports migraine headache. Recently had COVID but is getting over it (did not require hospitalization/severe measures). Says he had some falls recently due to weakness. Also had constipation recently that finally resolved after one bloody stool. He is scheduled for a colonoscopy as he hasn't had one recently. Does not attribute the falls or constipation to medications/opioids and denies any dose changes (reports he has tolerated the same doses for years without these concerns, though I advised him that with age sometimes a certain dose can create later side effects and might necessitate dose adjustments, to which he was cautiously open if needed). He reports his chronic neuropathic pain to be "status quo" and denied any new concerns. He is open to continuing his medications and being monitored, meeting again in 2 months.

## 2023-02-06 ENCOUNTER — RX RENEWAL (OUTPATIENT)
Age: 58
End: 2023-02-06

## 2023-02-07 ENCOUNTER — RX RENEWAL (OUTPATIENT)
Age: 58
End: 2023-02-07

## 2023-02-14 ENCOUNTER — APPOINTMENT (OUTPATIENT)
Dept: INFECTIOUS DISEASE | Facility: CLINIC | Age: 58
End: 2023-02-14
Payer: COMMERCIAL

## 2023-02-14 PROCEDURE — 99443: CPT

## 2023-03-07 ENCOUNTER — RX RENEWAL (OUTPATIENT)
Age: 58
End: 2023-03-07

## 2023-03-14 ENCOUNTER — NON-APPOINTMENT (OUTPATIENT)
Age: 58
End: 2023-03-14

## 2023-03-21 ENCOUNTER — APPOINTMENT (OUTPATIENT)
Dept: INFECTIOUS DISEASE | Facility: CLINIC | Age: 58
End: 2023-03-21
Payer: MEDICARE

## 2023-03-21 PROCEDURE — 99213 OFFICE O/P EST LOW 20 MIN: CPT | Mod: 95

## 2023-03-22 ENCOUNTER — APPOINTMENT (OUTPATIENT)
Dept: INFECTIOUS DISEASE | Facility: CLINIC | Age: 58
End: 2023-03-22

## 2023-03-27 ENCOUNTER — OFFICE (OUTPATIENT)
Dept: URBAN - METROPOLITAN AREA CLINIC 63 | Facility: CLINIC | Age: 58
Setting detail: OPHTHALMOLOGY
End: 2023-03-27
Payer: MEDICARE

## 2023-03-27 DIAGNOSIS — H53.2: ICD-10-CM

## 2023-03-27 DIAGNOSIS — E11.9: ICD-10-CM

## 2023-03-27 DIAGNOSIS — H52.13: ICD-10-CM

## 2023-03-27 PROCEDURE — 92004 COMPRE OPH EXAM NEW PT 1/>: CPT | Performed by: STUDENT IN AN ORGANIZED HEALTH CARE EDUCATION/TRAINING PROGRAM

## 2023-03-27 PROCEDURE — 92015 DETERMINE REFRACTIVE STATE: CPT | Performed by: STUDENT IN AN ORGANIZED HEALTH CARE EDUCATION/TRAINING PROGRAM

## 2023-03-27 ASSESSMENT — CONFRONTATIONAL VISUAL FIELD TEST (CVF)
OD_FINDINGS: FULL
OS_FINDINGS: FULL

## 2023-03-27 ASSESSMENT — TONOMETRY
OD_IOP_MMHG: 20
OS_IOP_MMHG: 20

## 2023-03-28 PROBLEM — H53.2 DIPLOPIA: Status: ACTIVE | Noted: 2023-03-27

## 2023-03-28 PROBLEM — E11.9 DIABETES TYPE 2 NO RETINOPATHY: Status: ACTIVE | Noted: 2023-03-27

## 2023-03-28 ASSESSMENT — REFRACTION_AUTOREFRACTION
OD_CYLINDER: -3.75
OD_SPHERE: -3.75
OS_CYLINDER: -3.00
OD_AXIS: 015
OS_AXIS: 173
OS_SPHERE: -4.25

## 2023-03-28 ASSESSMENT — REFRACTION_MANIFEST
OS_CYLINDER: -3.00
OS_VA1: 20/25
OD_CYLINDER: -3.25
OS_AXIS: 175
OU_VA: 20/20-1
OD_AXIS: 015
OS_SPHERE: -3.75
OD_SPHERE: -3.25
OD_VA1: 20/25

## 2023-03-28 ASSESSMENT — SPHEQUIV_DERIVED
OD_SPHEQUIV: -4.875
OD_SPHEQUIV: -5.625
OS_SPHEQUIV: -5.25
OS_SPHEQUIV: -5.75

## 2023-03-28 ASSESSMENT — VISUAL ACUITY
OD_BCVA: 20/25
OS_BCVA: 20/60

## 2023-04-05 ENCOUNTER — RX RENEWAL (OUTPATIENT)
Age: 58
End: 2023-04-05

## 2023-04-05 ENCOUNTER — APPOINTMENT (OUTPATIENT)
Dept: INFECTIOUS DISEASE | Facility: CLINIC | Age: 58
End: 2023-04-05
Payer: MEDICARE

## 2023-04-05 DIAGNOSIS — R68.83 CHILLS (WITHOUT FEVER): ICD-10-CM

## 2023-04-05 DIAGNOSIS — R41.3 OTHER AMNESIA: ICD-10-CM

## 2023-04-05 PROCEDURE — 99443: CPT

## 2023-04-05 RX ORDER — FERROUS SULFATE TAB 325 MG (65 MG ELEMENTAL FE) 325 (65 FE) MG
325 (65 FE) TAB ORAL DAILY
Refills: 0 | Status: ACTIVE | COMMUNITY
Start: 2022-02-10

## 2023-04-05 RX ORDER — HYDROCORTISONE 25 MG/G
2.5 CREAM TOPICAL
Qty: 60 | Refills: 0 | Status: COMPLETED | COMMUNITY
Start: 2022-03-22 | End: 2023-04-05

## 2023-04-05 RX ORDER — ONDANSETRON 8 MG/1
8 TABLET, ORALLY DISINTEGRATING ORAL
Qty: 60 | Refills: 0 | Status: COMPLETED | COMMUNITY
Start: 2017-04-13 | End: 2023-04-05

## 2023-04-05 RX ORDER — ABACAVIR AND LAMIVUDINE 600; 300 MG/1; MG/1
600-300 TABLET, FILM COATED ORAL
Qty: 30 | Refills: 0 | Status: COMPLETED | COMMUNITY
Start: 2022-08-11 | End: 2023-04-05

## 2023-04-05 RX ORDER — ICOSAPENT ETHYL 1000 MG/1
1 CAPSULE ORAL
Qty: 150 | Refills: 0 | Status: COMPLETED | COMMUNITY
Start: 2022-06-06 | End: 2023-04-05

## 2023-04-10 ENCOUNTER — NON-APPOINTMENT (OUTPATIENT)
Age: 58
End: 2023-04-10

## 2023-04-11 ENCOUNTER — APPOINTMENT (OUTPATIENT)
Dept: INFECTIOUS DISEASE | Facility: CLINIC | Age: 58
End: 2023-04-11

## 2023-04-13 ENCOUNTER — NON-APPOINTMENT (OUTPATIENT)
Age: 58
End: 2023-04-13

## 2023-04-14 ENCOUNTER — NON-APPOINTMENT (OUTPATIENT)
Age: 58
End: 2023-04-14

## 2023-04-24 ENCOUNTER — NON-APPOINTMENT (OUTPATIENT)
Age: 58
End: 2023-04-24

## 2023-04-25 ENCOUNTER — APPOINTMENT (OUTPATIENT)
Dept: INFECTIOUS DISEASE | Facility: CLINIC | Age: 58
End: 2023-04-25
Payer: MEDICARE

## 2023-04-25 PROCEDURE — 99442: CPT

## 2023-04-28 ENCOUNTER — OFFICE (OUTPATIENT)
Dept: URBAN - METROPOLITAN AREA CLINIC 63 | Facility: CLINIC | Age: 58
Setting detail: OPHTHALMOLOGY
End: 2023-04-28
Payer: MEDICARE

## 2023-04-28 DIAGNOSIS — H52.7: ICD-10-CM

## 2023-04-28 PROCEDURE — RX/CHECK RX/CHECK: Performed by: STUDENT IN AN ORGANIZED HEALTH CARE EDUCATION/TRAINING PROGRAM

## 2023-04-28 ASSESSMENT — REFRACTION_MANIFEST
OD_VA1: 20/25
OD_SPHERE: -3.25
OD_AXIS: 015
OS_ADD: +2.75
OS_SPHERE: -3.75
OS_VA1: 20/25
OD_AXIS: 015
OS_AXIS: 175
OU_VA: 20/20-1
OD_ADD: +2.75
OD_VA1: 20/25
OS_AXIS: 175
OS_VA1: 20/25
OS_CYLINDER: -3.00
OS_SPHERE: -3.75
OS_CYLINDER: -3.00
OD_CYLINDER: -3.25
OD_CYLINDER: -3.25
OD_SPHERE: -3.25

## 2023-04-28 ASSESSMENT — VISUAL ACUITY
OS_BCVA: 20/40
OD_BCVA: 20/30

## 2023-04-28 ASSESSMENT — AXIALLENGTH_DERIVED
OD_AL: 25.3888
OS_AL: 25.8449
OD_AL: 25.674
OD_AL: 25.3888
OS_AL: 25.6132
OS_AL: 25.6132

## 2023-04-28 ASSESSMENT — KERATOMETRY
OD_K1POWER_DIOPTERS: 43.00
OS_K1POWER_DIOPTERS: 43.00
OD_K2POWER_DIOPTERS: 45.25
OD_AXISANGLE_DEGREES: 106
OS_AXISANGLE_DEGREES: 88
OS_K2POWER_DIOPTERS: 45.00

## 2023-04-28 ASSESSMENT — TONOMETRY: OD_IOP_MMHG: 19

## 2023-04-28 ASSESSMENT — SPHEQUIV_DERIVED
OD_SPHEQUIV: -4.875
OS_SPHEQUIV: -5.75
OS_SPHEQUIV: -5.25
OD_SPHEQUIV: -5.5
OS_SPHEQUIV: -5.25
OD_SPHEQUIV: -4.875

## 2023-04-28 ASSESSMENT — REFRACTION_AUTOREFRACTION
OS_CYLINDER: -3.50
OS_SPHERE: -4.00
OD_AXIS: 016
OS_AXIS: 178
OD_SPHERE: -3.25
OD_CYLINDER: -4.50

## 2023-04-28 ASSESSMENT — CONFRONTATIONAL VISUAL FIELD TEST (CVF)
OD_FINDINGS: FULL
OS_FINDINGS: FULL

## 2023-05-04 ENCOUNTER — RX RENEWAL (OUTPATIENT)
Age: 58
End: 2023-05-04

## 2023-05-25 ENCOUNTER — RX RENEWAL (OUTPATIENT)
Age: 58
End: 2023-05-25

## 2023-05-26 ENCOUNTER — RX RENEWAL (OUTPATIENT)
Age: 58
End: 2023-05-26

## 2023-05-30 ENCOUNTER — APPOINTMENT (OUTPATIENT)
Dept: INFECTIOUS DISEASE | Facility: CLINIC | Age: 58
End: 2023-05-30
Payer: MEDICARE

## 2023-05-30 PROCEDURE — 99213 OFFICE O/P EST LOW 20 MIN: CPT | Mod: 95

## 2023-06-06 ENCOUNTER — NON-APPOINTMENT (OUTPATIENT)
Age: 58
End: 2023-06-06

## 2023-06-06 ENCOUNTER — APPOINTMENT (OUTPATIENT)
Dept: INFECTIOUS DISEASE | Facility: CLINIC | Age: 58
End: 2023-06-06
Payer: MEDICARE

## 2023-06-06 PROCEDURE — ZZZZZ: CPT

## 2023-06-13 ENCOUNTER — APPOINTMENT (OUTPATIENT)
Dept: INFECTIOUS DISEASE | Facility: CLINIC | Age: 58
End: 2023-06-13
Payer: MEDICARE

## 2023-06-13 PROCEDURE — 99442: CPT

## 2023-06-14 ENCOUNTER — APPOINTMENT (OUTPATIENT)
Dept: ENDOCRINOLOGY | Facility: CLINIC | Age: 58
End: 2023-06-14

## 2023-06-16 ENCOUNTER — NON-APPOINTMENT (OUTPATIENT)
Age: 58
End: 2023-06-16

## 2023-06-17 ENCOUNTER — NON-APPOINTMENT (OUTPATIENT)
Age: 58
End: 2023-06-17

## 2023-06-21 ENCOUNTER — NON-APPOINTMENT (OUTPATIENT)
Age: 58
End: 2023-06-21

## 2023-06-28 ENCOUNTER — NON-APPOINTMENT (OUTPATIENT)
Age: 58
End: 2023-06-28

## 2023-06-28 ENCOUNTER — APPOINTMENT (OUTPATIENT)
Dept: INFECTIOUS DISEASE | Facility: CLINIC | Age: 58
End: 2023-06-28

## 2023-06-29 ENCOUNTER — NON-APPOINTMENT (OUTPATIENT)
Age: 58
End: 2023-06-29

## 2023-06-30 ENCOUNTER — NON-APPOINTMENT (OUTPATIENT)
Age: 58
End: 2023-06-30

## 2023-06-30 ENCOUNTER — APPOINTMENT (OUTPATIENT)
Dept: INFECTIOUS DISEASE | Facility: CLINIC | Age: 58
End: 2023-06-30

## 2023-07-03 ENCOUNTER — RX RENEWAL (OUTPATIENT)
Age: 58
End: 2023-07-03

## 2023-07-05 ENCOUNTER — NON-APPOINTMENT (OUTPATIENT)
Age: 58
End: 2023-07-05

## 2023-07-07 ENCOUNTER — NON-APPOINTMENT (OUTPATIENT)
Age: 58
End: 2023-07-07

## 2023-07-11 ENCOUNTER — NON-APPOINTMENT (OUTPATIENT)
Age: 58
End: 2023-07-11

## 2023-07-20 ENCOUNTER — APPOINTMENT (OUTPATIENT)
Dept: INFECTIOUS DISEASE | Facility: CLINIC | Age: 58
End: 2023-07-20

## 2023-07-20 ENCOUNTER — NON-APPOINTMENT (OUTPATIENT)
Age: 58
End: 2023-07-20

## 2023-07-21 ENCOUNTER — NON-APPOINTMENT (OUTPATIENT)
Age: 58
End: 2023-07-21

## 2023-07-25 ENCOUNTER — APPOINTMENT (OUTPATIENT)
Dept: INFECTIOUS DISEASE | Facility: CLINIC | Age: 58
End: 2023-07-25
Payer: COMMERCIAL

## 2023-07-25 ENCOUNTER — APPOINTMENT (OUTPATIENT)
Dept: INFECTIOUS DISEASE | Facility: CLINIC | Age: 58
End: 2023-07-25

## 2023-07-25 ENCOUNTER — NON-APPOINTMENT (OUTPATIENT)
Age: 58
End: 2023-07-25

## 2023-07-25 PROCEDURE — 99213 OFFICE O/P EST LOW 20 MIN: CPT

## 2023-08-07 ENCOUNTER — RX RENEWAL (OUTPATIENT)
Age: 58
End: 2023-08-07

## 2023-08-08 ENCOUNTER — NON-APPOINTMENT (OUTPATIENT)
Age: 58
End: 2023-08-08

## 2023-08-09 ENCOUNTER — APPOINTMENT (OUTPATIENT)
Dept: PSYCHIATRY | Facility: CLINIC | Age: 58
End: 2023-08-09

## 2023-08-10 ENCOUNTER — NON-APPOINTMENT (OUTPATIENT)
Age: 58
End: 2023-08-10

## 2023-08-15 ENCOUNTER — NON-APPOINTMENT (OUTPATIENT)
Age: 58
End: 2023-08-15

## 2023-08-15 ENCOUNTER — APPOINTMENT (OUTPATIENT)
Dept: INFECTIOUS DISEASE | Facility: CLINIC | Age: 58
End: 2023-08-15
Payer: COMMERCIAL

## 2023-08-15 PROCEDURE — 99441: CPT

## 2023-09-06 ENCOUNTER — APPOINTMENT (OUTPATIENT)
Dept: PSYCHIATRY | Facility: CLINIC | Age: 58
End: 2023-09-06
Payer: MEDICARE

## 2023-09-06 PROCEDURE — 99213 OFFICE O/P EST LOW 20 MIN: CPT | Mod: 95

## 2023-09-26 ENCOUNTER — APPOINTMENT (OUTPATIENT)
Dept: INFECTIOUS DISEASE | Facility: CLINIC | Age: 58
End: 2023-09-26

## 2023-10-03 ENCOUNTER — APPOINTMENT (OUTPATIENT)
Dept: INFECTIOUS DISEASE | Facility: CLINIC | Age: 58
End: 2023-10-03
Payer: MEDICARE

## 2023-10-03 PROCEDURE — 99213 OFFICE O/P EST LOW 20 MIN: CPT | Mod: 95

## 2023-10-12 ENCOUNTER — RX RENEWAL (OUTPATIENT)
Age: 58
End: 2023-10-12

## 2023-10-12 ENCOUNTER — NON-APPOINTMENT (OUTPATIENT)
Age: 58
End: 2023-10-12

## 2023-10-12 RX ORDER — ALPRAZOLAM 2 MG/1
2 TABLET ORAL
Qty: 90 | Refills: 0 | Status: DISCONTINUED | COMMUNITY
Start: 2018-08-15 | End: 2019-03-28

## 2023-10-19 ENCOUNTER — NON-APPOINTMENT (OUTPATIENT)
Age: 58
End: 2023-10-19

## 2023-11-03 ENCOUNTER — APPOINTMENT (OUTPATIENT)
Dept: UROLOGY | Facility: CLINIC | Age: 58
End: 2023-11-03
Payer: MEDICARE

## 2023-11-03 ENCOUNTER — RX RENEWAL (OUTPATIENT)
Age: 58
End: 2023-11-03

## 2023-11-03 VITALS
BODY MASS INDEX: 40.43 KG/M2 | SYSTOLIC BLOOD PRESSURE: 131 MMHG | HEIGHT: 74 IN | HEART RATE: 69 BPM | TEMPERATURE: 97 F | WEIGHT: 315 LBS | DIASTOLIC BLOOD PRESSURE: 78 MMHG

## 2023-11-03 DIAGNOSIS — M54.50 LOW BACK PAIN, UNSPECIFIED: ICD-10-CM

## 2023-11-03 PROCEDURE — 99203 OFFICE O/P NEW LOW 30 MIN: CPT

## 2023-11-06 LAB — BACTERIA UR CULT: NORMAL

## 2023-11-14 ENCOUNTER — NON-APPOINTMENT (OUTPATIENT)
Age: 58
End: 2023-11-14

## 2023-11-16 ENCOUNTER — NON-APPOINTMENT (OUTPATIENT)
Age: 58
End: 2023-11-16

## 2023-11-20 ENCOUNTER — NON-APPOINTMENT (OUTPATIENT)
Age: 58
End: 2023-11-20

## 2023-11-21 ENCOUNTER — APPOINTMENT (OUTPATIENT)
Dept: INFECTIOUS DISEASE | Facility: CLINIC | Age: 58
End: 2023-11-21
Payer: MEDICARE

## 2023-11-21 PROCEDURE — 99443: CPT

## 2023-11-22 ENCOUNTER — APPOINTMENT (OUTPATIENT)
Dept: PSYCHIATRY | Facility: HOSPITAL | Age: 58
End: 2023-11-22

## 2023-11-22 PROCEDURE — XXXXX: CPT | Mod: 1L

## 2023-11-22 PROCEDURE — 99213 OFFICE O/P EST LOW 20 MIN: CPT | Mod: 1L,95

## 2023-11-24 NOTE — REASON FOR VISIT
Right breast localized lumpectomy    Patient Name: Melba Manning    SURGERY DATE: 23    : 1963    AGE: 61 y.o. 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 General anesthesia was administered by the anesthetist to titrate to effect. The right breast was prepped and draped in the usual sterile fashion and a time out procedure was performed. Next the localizer was used to identify the SCOTT in the breast at the 6 oclock position. Local anesthesia was used to anesthetize the skin. Using a 15 blade scalpel a vertical incision was made. Using the localizer probe to guide dissection bovie was used to dissect through the breast tissue  to the shortest distance identified by the probe. An marialuisa clamped was then used to grasp the breast tissue and probe confirmed the SCOTT within the specimen. Circumferential dissection was performed. The specimen was then marked single short superior, single long lateral, double long anterior and sent of the field. Faxitron was used to confirm the SCOTT and clip were in the specimen with good margin. The wound was irrigated and suctioned dry and found to be hemostatic. Clips were placed in the bed of the wound. The skin was then re -approximated with 3-0 vicryl in a simple interrupted fashion and skin was closed with 4-0 monocryl in a subcuticular fashion. Dermabond dressing was applied. The patient was subsequently extubated, tolerated the procedure well and sent to recovery in stable condition.      Implant: * No implants in log [Follow-Up] : a follow-up visit

## 2023-11-27 ENCOUNTER — OUTPATIENT (OUTPATIENT)
Dept: OUTPATIENT SERVICES | Facility: HOSPITAL | Age: 58
LOS: 1 days | End: 2023-11-27
Payer: MEDICARE

## 2023-11-27 DIAGNOSIS — N13.2 HYDRONEPHROSIS WITH RENAL AND URETERAL CALCULOUS OBSTRUCTION: ICD-10-CM

## 2023-11-27 DIAGNOSIS — N20.0 CALCULUS OF KIDNEY: ICD-10-CM

## 2023-11-27 PROCEDURE — A9562: CPT

## 2023-11-27 PROCEDURE — 78707 K FLOW/FUNCT IMAGE W/O DRUG: CPT

## 2023-11-27 PROCEDURE — 78707 K FLOW/FUNCT IMAGE W/O DRUG: CPT | Mod: 26

## 2023-11-27 RX ORDER — SODIUM CHLORIDE 9 MG/ML
250 INJECTION INTRAMUSCULAR; INTRAVENOUS; SUBCUTANEOUS ONCE
Refills: 0 | Status: DISCONTINUED | OUTPATIENT
Start: 2023-11-27 | End: 2023-12-11

## 2023-12-05 ENCOUNTER — APPOINTMENT (OUTPATIENT)
Dept: UROLOGY | Facility: CLINIC | Age: 58
End: 2023-12-05
Payer: MEDICARE

## 2023-12-05 PROCEDURE — 99442: CPT

## 2023-12-12 ENCOUNTER — APPOINTMENT (OUTPATIENT)
Dept: INFECTIOUS DISEASE | Facility: CLINIC | Age: 58
End: 2023-12-12

## 2023-12-12 ENCOUNTER — APPOINTMENT (OUTPATIENT)
Dept: INFECTIOUS DISEASE | Facility: CLINIC | Age: 58
End: 2023-12-12
Payer: MEDICARE

## 2023-12-12 ENCOUNTER — RX RENEWAL (OUTPATIENT)
Age: 58
End: 2023-12-12

## 2023-12-12 VITALS
WEIGHT: 315 LBS | HEIGHT: 72 IN | BODY MASS INDEX: 42.66 KG/M2 | DIASTOLIC BLOOD PRESSURE: 83 MMHG | HEART RATE: 76 BPM | SYSTOLIC BLOOD PRESSURE: 136 MMHG | TEMPERATURE: 97.8 F | OXYGEN SATURATION: 95 %

## 2023-12-12 DIAGNOSIS — D64.9 ANEMIA, UNSPECIFIED: ICD-10-CM

## 2023-12-12 LAB
CD3 CELLS # BLD: 1001 CELLS/UL
CD3 CELLS NFR BLD: 77 %
CD3+CD4+ CELLS # BLD: 415 CELLS/UL
CD3+CD4+ CELLS NFR BLD: 32 %
CD3+CD4+ CELLS/CD3+CD8+ CLL SPEC: 0.74 RATIO
CD3+CD8+ CELLS # SPEC: 562 CELLS/UL
CD3+CD8+ CELLS NFR BLD: 43 %
HCT VFR BLD CALC: 35.8 %
HGB BLD-MCNC: 10.6 G/DL
MCHC RBC-ENTMCNC: 25.3 PG
MCHC RBC-ENTMCNC: 29.6 GM/DL
MCV RBC AUTO: 85.4 FL
PLATELET # BLD AUTO: 397 K/UL
RBC # BLD: 4.19 M/UL
RBC # FLD: 14.6 %
WBC # FLD AUTO: 8.67 K/UL

## 2023-12-12 PROCEDURE — 99215 OFFICE O/P EST HI 40 MIN: CPT

## 2023-12-12 RX ORDER — EMPAGLIFLOZIN 10 MG/1
10 TABLET, FILM COATED ORAL
Refills: 0 | Status: ACTIVE | COMMUNITY
Start: 2023-12-12

## 2023-12-12 RX ORDER — POLYETHYLENE GLYCOL 3350 17 G/17G
17 POWDER, FOR SOLUTION ORAL TWICE DAILY
Qty: 1 | Refills: 1 | Status: COMPLETED | COMMUNITY
Start: 2023-04-05 | End: 2023-12-12

## 2023-12-12 RX ORDER — TESTOSTERONE CYPIONATE 200 MG/ML
200 INJECTION, SOLUTION INTRAMUSCULAR
Qty: 4 | Refills: 0 | Status: COMPLETED | COMMUNITY
Start: 2018-01-26 | End: 2023-12-12

## 2023-12-13 LAB
25(OH)D3 SERPL-MCNC: 24.8 NG/ML
ALBUMIN SERPL ELPH-MCNC: 3.8 G/DL
ALP BLD-CCNC: 38 U/L
ALT SERPL-CCNC: 8 U/L
ANION GAP SERPL CALC-SCNC: 14 MMOL/L
AST SERPL-CCNC: 10 U/L
BILIRUB SERPL-MCNC: 0.3 MG/DL
BUN SERPL-MCNC: 26 MG/DL
CALCIUM SERPL-MCNC: 9.1 MG/DL
CHLORIDE SERPL-SCNC: 101 MMOL/L
CHOLEST SERPL-MCNC: 125 MG/DL
CO2 SERPL-SCNC: 26 MMOL/L
CREAT SERPL-MCNC: 1.73 MG/DL
EGFR: 45 ML/MIN/1.73M2
ESTIMATED AVERAGE GLUCOSE: 197 MG/DL
GLUCOSE SERPL-MCNC: 145 MG/DL
HBA1C MFR BLD HPLC: 8.5 %
HBV SURFACE AB SER QL: REACTIVE
HCV AB SER QL: NONREACTIVE
HCV S/CO RATIO: 0.4 S/CO
HDLC SERPL-MCNC: 42 MG/DL
HIV1 RNA # SERPL NAA+PROBE: ABNORMAL
HIV1 RNA # SERPL NAA+PROBE: ABNORMAL COPIES/ML
LDLC SERPL CALC-MCNC: 62 MG/DL
NONHDLC SERPL-MCNC: 83 MG/DL
PHOSPHATE SERPL-MCNC: 4.6 MG/DL
POTASSIUM SERPL-SCNC: 4.4 MMOL/L
PROT SERPL-MCNC: 7.2 G/DL
PSA SERPL-MCNC: 0.03 NG/ML
SODIUM SERPL-SCNC: 141 MMOL/L
T PALLIDUM AB SER QL IA: NEGATIVE
TRIGL SERPL-MCNC: 114 MG/DL
TSH SERPL-ACNC: 0.56 UIU/ML
VIRAL LOAD INTERP: NORMAL
VIRAL LOAD LOG: ABNORMAL LG COP/ML

## 2023-12-13 NOTE — REVIEW OF SYSTEMS
[As Noted in HPI] : as noted in HPI [Dizziness] : dizziness [Limb Weakness] : limb weakness [FreeTextEntry9] : leg weakness, neuropathy of bilat feet - under pain management.  Involuntary muscle spasms [de-identified] : skin growth - left posterior mid thigh.  hanging growth under right axillary area

## 2023-12-13 NOTE — PHYSICAL EXAM
[General Appearance - Alert] : alert [General Appearance - In No Acute Distress] : in no acute distress [General Appearance - Well Nourished] : well nourished [Sclera] : the sclera and conjunctiva were normal [PERRL With Normal Accommodation] : pupils were equal in size, round, reactive to light [Extraocular Movements] : extraocular movements were intact [Outer Ear] : the ears and nose were normal in appearance [Examination Of The Oral Cavity] : the lips and gums were normal [Both Tympanic Membranes Were Examined] : both tympanic membranes were normal [Oropharynx] : the oropharynx was normal with no thrush [Neck Appearance] : the appearance of the neck was normal [Neck Cervical Mass (___cm)] : no neck mass was observed [Jugular Venous Distention Increased] : there was no jugular-venous distention [Thyroid Diffuse Enlargement] : the thyroid was not enlarged [Respiration, Rhythm And Depth] : normal respiratory rhythm and effort [Exaggerated Use Of Accessory Muscles For Inspiration] : no accessory muscle use [Auscultation Breath Sounds / Voice Sounds] : lungs were clear to auscultation bilaterally [Heart Rate And Rhythm] : heart rate was normal and rhythm regular [Heart Sounds] : normal S1 and S2 [Heart Sounds Gallop] : no gallops [Murmurs] : no murmurs [Heart Sounds Pericardial Friction Rub] : no pericardial rub [Edema] : there was no peripheral edema [Abdomen Soft] : soft [Bowel Sounds] : normal bowel sounds [Abdomen Tenderness] : non-tender [Costovertebral Angle Tenderness] : no CVA tenderness [No Palpable Adenopathy] : no palpable adenopathy [Musculoskeletal - Swelling] : no joint swelling [Range of Motion to Joints] : range of motion to joints [Nail Clubbing] : no clubbing  or cyanosis of the fingernails [Skin Color & Pigmentation] : normal skin color and pigmentation [] : no rash [Skin Lesions] : no skin lesions [Cranial Nerves] : cranial nerves 2-12 were intact [Oriented To Time, Place, And Person] : oriented to person, place, and time [Affect] : the affect was normal [FreeTextEntry1] : forgetful.

## 2023-12-13 NOTE — ASSESSMENT
[Treatment Education] : treatment education [Treatment Adherence] : treatment adherence [Rx Dose / Side Effects] : Rx dose/side effects [Medical Care Issues] : medical care issues [Drug Interactions / Side Effects] : drug interactions/side effects [FreeTextEntry1] : HIV annual consult.    12/12/2023 Plan  HIV  test results from previous consult reviewed with patient - from 10/2022.  Advised patient these are the last results this practice has- no other blood work results were forwarded to this office.   1. continue current treatment - refills given  2. do blood work today 3. f/u one month for in office consult  4. unable to give Influenza vaccine due to not avail at time of consult.    Abnormal skin growth  noted with hanging golf ball sized growth on left axillary area 1. referral to surgeon given for further management  Atrophy Left kidney all imaging and testing for renal reviewed in detail with patient.  Answered questions PRN 1. US renal done   2. referral to nephrology given for further evaluation   Hearing loss  noted with vertigo, falls and hearing loss 1. referral to ENT given   Skin mass noted with brown, raised, rough, uneven, irregular borders growth on left posterior mid thigh.  Counselled on Kaposi's Sarcoma causes, what having the growth means, lab testing needed.   1. referral to dermatology for biopsy and further evaluation.   2. do blood work at the office today.

## 2023-12-13 NOTE — HISTORY OF PRESENT ILLNESS
[FreeTextEntry1] : 59 yo male here for HIV annual consult in the company of care giver, Cole taking Biktarvy daily with no missed doses or SE has not done blood work since 8/2022 - states does blood draws with home care lab results were not forwarded to this office.  went to Urology due to lower GFR  imaging done and noted various stones - one of 1.7 x 1.2cm size  also noted Parenchymal thinning or atrophy.   Renal function study was done and on left kidney noted no spontaneous net clearance and a flat plateau clearance curve. Urology recommended possible left nephrectomy if pt becomes symptomatic has not consulted with nephrology   also noted with lesion left posterior thigh was told by urology could possibly be Kaposi's Sarcoma.   pt concerned that this may be a signal of worsening HIV status.   Pt continues with memory loss / forgetfulness pt f/u with neurology and had EEG done  did not f/u for results of work up  also continues with intermittent vertigo and falls  confirms hearing loss has not consulted with ENT for this.    Has large growth in right axillary area  has gone to dermatology previously and told needs surgery consult.       From previous consult 4/5/2023 :  This telephonic visit was provided via audio only technology. The patient, HEMAL YOUNG, was located at home, [address] [place] [address] , at the time of the visit.  The provider, ANNETTE GILMORE, was located at [place], [place] at the time of the visit. The patient, HEMAL YOUNG and Provider participated in the telephonic visit.   Verbal consent for telephonic services was given on April 5, 2023 by the patient, HEMAL YOUNG. 56 yo male video called for telemed HIV f/u consult taking Biktarvy daily with no missed doses or SE  Last night had chills and felt feverish with generalized body aches denies any other symptoms today awoke better had home nurse visit and felt it was due to neurological symptoms and to f/u with neurology  he is having memory difficulty, word finding he is also having vertigo fell last week and hit left lateral abdomen and back has appt with neurology 4/18/2023.  he is also having increased constipation symptoms taking Colace with minimal results.  Pt states would like refill of testosterone medication as has hx of low testosterone on blood work.    12/12/2023 Plan  HIV  test results from previous consult reviewed with patient - from 10/2022.  Advised patient these are the last results this practice has- no other blood work results were forwarded to this office.   1. continue current treatment - refills given  2. do blood work today 3. f/u one month for in office consult  4. unable to give Influenza vaccine due to not avail at time of consult.    Abnormal skin growth  noted with hanging golf ball sized growth on left axillary area 1. referral to surgeon given for further management  Atrophy Left kidney all imaging and testing for renal reviewed in detail with patient.  Answered questions PRN 1. US renal done   2. referral to nephrology given for further evaluation   Hearing loss  noted with vertigo, falls and hearing loss 1. referral to ENT given   Skin mass noted with brown, raised, rough, uneven, irregular borders growth on left posterior mid thigh.  Counselled on Kaposi's Sarcoma causes, what having the growth means, lab testing needed.   1. referral to dermatology for biopsy and further evaluation.   2. do blood work at the office today.

## 2023-12-13 NOTE — DISCUSSION/SUMMARY
[FreeTextEntry1] : Pt called for refill but also to express he has had an uptick in severe neuropathic pain (more frequently experiencing his usual neuropathy) in context of recent medical stressors (hydronephrosis 2/2 stone, ? kaposi's sarcoma dx). Asking for increase in dose of Oxycodone as it is effective PRN but has been requiring more per day.   New Rx sent today for 3 tabs Q 6 (total 12 per day instead of usual 9 per day). Also refilled Lyrica and Methadone. Agreed to conduct follow-up visit in 2-3 weeks to assess effects.

## 2023-12-14 DIAGNOSIS — R79.89 OTHER SPECIFIED ABNORMAL FINDINGS OF BLOOD CHEMISTRY: ICD-10-CM

## 2023-12-14 LAB
M TB IFN-G BLD-IMP: NEGATIVE
QUANTIFERON TB PLUS MITOGEN MINUS NIL: >10 IU/ML
QUANTIFERON TB PLUS NIL: 0.02 IU/ML
QUANTIFERON TB PLUS TB1 MINUS NIL: 0 IU/ML
QUANTIFERON TB PLUS TB2 MINUS NIL: 0 IU/ML
TESTOST FREE SERPL-MCNC: 3.5 PG/ML
TESTOST SERPL-MCNC: 29 NG/DL

## 2023-12-18 ENCOUNTER — OUTPATIENT (OUTPATIENT)
Dept: OUTPATIENT SERVICES | Facility: HOSPITAL | Age: 58
LOS: 1 days | End: 2023-12-18

## 2023-12-18 ENCOUNTER — APPOINTMENT (OUTPATIENT)
Dept: ULTRASOUND IMAGING | Facility: CLINIC | Age: 58
End: 2023-12-18
Payer: MEDICARE

## 2023-12-18 ENCOUNTER — NON-APPOINTMENT (OUTPATIENT)
Age: 58
End: 2023-12-18

## 2023-12-18 DIAGNOSIS — N26.1 ATROPHY OF KIDNEY (TERMINAL): ICD-10-CM

## 2023-12-18 PROCEDURE — 76775 US EXAM ABDO BACK WALL LIM: CPT | Mod: 26

## 2023-12-19 ENCOUNTER — APPOINTMENT (OUTPATIENT)
Dept: INFECTIOUS DISEASE | Facility: CLINIC | Age: 58
End: 2023-12-19
Payer: MEDICARE

## 2023-12-19 ENCOUNTER — APPOINTMENT (OUTPATIENT)
Dept: OTOLARYNGOLOGY | Facility: CLINIC | Age: 58
End: 2023-12-19

## 2023-12-19 ENCOUNTER — NON-APPOINTMENT (OUTPATIENT)
Age: 58
End: 2023-12-19

## 2023-12-19 PROCEDURE — 99443: CPT

## 2023-12-20 ENCOUNTER — APPOINTMENT (OUTPATIENT)
Dept: DERMATOLOGY | Facility: CLINIC | Age: 58
End: 2023-12-20
Payer: MEDICARE

## 2023-12-20 ENCOUNTER — NON-APPOINTMENT (OUTPATIENT)
Age: 58
End: 2023-12-20

## 2023-12-20 PROCEDURE — 99202 OFFICE O/P NEW SF 15 MIN: CPT | Mod: 25

## 2023-12-20 PROCEDURE — 11102 TANGNTL BX SKIN SINGLE LES: CPT

## 2023-12-22 ENCOUNTER — NON-APPOINTMENT (OUTPATIENT)
Age: 58
End: 2023-12-22

## 2023-12-28 ENCOUNTER — APPOINTMENT (OUTPATIENT)
Dept: SURGERY | Facility: CLINIC | Age: 58
End: 2023-12-28

## 2023-12-29 ENCOUNTER — APPOINTMENT (OUTPATIENT)
Dept: NEPHROLOGY | Facility: CLINIC | Age: 58
End: 2023-12-29

## 2024-01-04 NOTE — DISCUSSION/SUMMARY
[FreeTextEntry1] : Patient called with increased pain after recent fall on tailbone; xrays done at home. Has communicated excruciating acute on chronic pain necessitating increased utilization of oxycodone IR. He has a notably high tolerance for  opioids given long term chronic therapy. He has asked for an increase in dose/quantity. I sent a prescription to this effect: Oxycodone 30mg, 3 tabs by mouth every 4 hours as needed. Asked patient to strongly consider ED visit/inpatient medical admission if continues to experience unbearable pain. Will follow-up as needed

## 2024-01-05 ENCOUNTER — RX RENEWAL (OUTPATIENT)
Age: 59
End: 2024-01-05

## 2024-01-08 ENCOUNTER — RX RENEWAL (OUTPATIENT)
Age: 59
End: 2024-01-08

## 2024-01-10 ENCOUNTER — RX RENEWAL (OUTPATIENT)
Age: 59
End: 2024-01-10

## 2024-01-12 ENCOUNTER — APPOINTMENT (OUTPATIENT)
Dept: SURGERY | Facility: CLINIC | Age: 59
End: 2024-01-12

## 2024-01-16 ENCOUNTER — NON-APPOINTMENT (OUTPATIENT)
Age: 59
End: 2024-01-16

## 2024-01-18 ENCOUNTER — NON-APPOINTMENT (OUTPATIENT)
Age: 59
End: 2024-01-18

## 2024-02-05 ENCOUNTER — NON-APPOINTMENT (OUTPATIENT)
Age: 59
End: 2024-02-05

## 2024-02-06 ENCOUNTER — APPOINTMENT (OUTPATIENT)
Dept: INFECTIOUS DISEASE | Facility: CLINIC | Age: 59
End: 2024-02-06
Payer: MEDICARE

## 2024-02-06 PROCEDURE — 99213 OFFICE O/P EST LOW 20 MIN: CPT

## 2024-02-07 ENCOUNTER — APPOINTMENT (OUTPATIENT)
Dept: NEPHROLOGY | Facility: CLINIC | Age: 59
End: 2024-02-07
Payer: MEDICARE

## 2024-02-07 VITALS
SYSTOLIC BLOOD PRESSURE: 126 MMHG | BODY MASS INDEX: 42.66 KG/M2 | WEIGHT: 315 LBS | HEART RATE: 81 BPM | DIASTOLIC BLOOD PRESSURE: 76 MMHG | HEIGHT: 72 IN | TEMPERATURE: 96.4 F | OXYGEN SATURATION: 96 %

## 2024-02-07 DIAGNOSIS — N26.1 ATROPHY OF KIDNEY (TERMINAL): ICD-10-CM

## 2024-02-07 DIAGNOSIS — G63 HUMAN IMMUNODEFICIENCY VIRUS [HIV] DISEASE: ICD-10-CM

## 2024-02-07 DIAGNOSIS — N31.9 NEUROMUSCULAR DYSFUNCTION OF BLADDER, UNSPECIFIED: ICD-10-CM

## 2024-02-07 DIAGNOSIS — N20.0 CALCULUS OF KIDNEY: ICD-10-CM

## 2024-02-07 DIAGNOSIS — B20 HUMAN IMMUNODEFICIENCY VIRUS [HIV] DISEASE: ICD-10-CM

## 2024-02-07 PROCEDURE — 99205 OFFICE O/P NEW HI 60 MIN: CPT

## 2024-02-12 ENCOUNTER — NON-APPOINTMENT (OUTPATIENT)
Age: 59
End: 2024-02-12

## 2024-02-12 ENCOUNTER — APPOINTMENT (OUTPATIENT)
Dept: OTOLARYNGOLOGY | Facility: CLINIC | Age: 59
End: 2024-02-12

## 2024-02-14 ENCOUNTER — APPOINTMENT (OUTPATIENT)
Dept: PSYCHIATRY | Facility: HOSPITAL | Age: 59
End: 2024-02-14

## 2024-02-14 PROCEDURE — XXXXX: CPT | Mod: 1L

## 2024-02-20 ENCOUNTER — APPOINTMENT (OUTPATIENT)
Dept: INFECTIOUS DISEASE | Facility: CLINIC | Age: 59
End: 2024-02-20

## 2024-02-22 NOTE — REASON FOR VISIT
[Patient preference] : as per patient preference [Access issues (e.g., transportation, impaired mobility, etc.)] : due to patient's access issues [Telehealth (audio & video) - Individual/Group] : This visit was provided via telehealth using real-time 2-way audio visual technology. [Medical Office: ([facility])___] : The provider was located at the medical office in [unfilled]. [Home] : The patient, [unfilled], was located at home, [unfilled], at the time of the visit. [Patient] : Patient [FreeTextEntry1] : chronic pain

## 2024-02-22 NOTE — PHYSICAL EXAM
[FreeTextEntry5] : deferred, virtual [Cooperative] : cooperative [Euthymic] : euthymic [Full] : full [Clear] : clear [Linear/Goal Directed] : linear/goal directed [Average] : average [WNL] : within normal limits

## 2024-02-22 NOTE — HISTORY OF PRESENT ILLNESS
[FreeTextEntry1] : Pt is a 57 yr old male with PMH HIV (since late 1980's, well-controlled, on HAART), anxiety, and chronic peripheral neuropathy (HIV-related), formerly in care with Dr. Yeung (pain mgmt) followed by Dr. Hawley (palliative). Referred to my practice for continuation of chronic pain treatment/opioid management.  Interval Hx: Chart reviewed beforehand. Pt seen virtually. Pleasant, cooperative, AAO X3 with no overt confusion, neurological deficits, or distress. Reports pain from recent tailbone injury/fall has improved, relieved that he got through that. Seems less distressed. Was thankful that I temporarily increased his opioids for tailbone-related pain.   Invited me to connect with his primary care NP Yvonne Webb who does house calls with him. I spoke with her and she reports knowing him x 6 years; she denies any concerns related to over-medication/intoxication versus withdrawal from controlled substances. We reviewed the meds I prescribe for him as well as recent concerns over needing very high amounts for acute/subacute pain flares. She was appreciative of the possibility of having to reduce agents over time as needed and carefully.

## 2024-02-22 NOTE — DISCUSSION/SUMMARY
[FreeTextEntry1] : Pt is a 57 yr old male with PMH HIV (since late 1980's, well-controlled, on HAART), anxiety, and chronic peripheral neuropathy (HIV-related), formerly in care with Dr. Yeung (pain mgmt) followed by Dr. Hawley (palliative). Referred to my practice for continuation of chronic pain treatment/opioid management. The patient suffers from chronic, debilitating neuropathy and has been partially-controlled with chronic opioid therapy without evidence of severe side effects or addiction/misuse. As such, the benefits from continuing chronic opioid therapy currently outweigh the risks with appropriate monitoring.  Plan: - Continue Methadone 10mg 4 tabs in AM and 4 tabs in PM; Rx refilled - Continue Oxycodone 30mg 2 tabs 3-5/day; Rx refilled - Continue Lyrica 150mg BID; Rx refilled. - Will renew medications q 30 days - Will have virtual f/u with patient PRN

## 2024-02-23 ENCOUNTER — NON-APPOINTMENT (OUTPATIENT)
Age: 59
End: 2024-02-23

## 2024-02-26 ENCOUNTER — APPOINTMENT (OUTPATIENT)
Dept: INFECTIOUS DISEASE | Facility: CLINIC | Age: 59
End: 2024-02-26

## 2024-02-29 ENCOUNTER — RX RENEWAL (OUTPATIENT)
Age: 59
End: 2024-02-29

## 2024-03-06 ENCOUNTER — APPOINTMENT (OUTPATIENT)
Dept: PSYCHIATRY | Facility: HOSPITAL | Age: 59
End: 2024-03-06

## 2024-03-06 PROCEDURE — 99202 OFFICE O/P NEW SF 15 MIN: CPT | Mod: 95

## 2024-03-07 NOTE — PHYSICAL EXAM
[FreeTextEntry5] : deferred, virtual format [Cooperative] : cooperative [Euthymic] : euthymic [Clear] : clear [Linear/Goal Directed] : linear/goal directed [Full] : full [Average] : average [WNL] : within normal limits

## 2024-03-07 NOTE — REASON FOR VISIT
[Access issues (e.g., transportation, impaired mobility, etc.)] : due to patient's access issues [Medical Office: ([facility])___] : The provider was located at the medical office in [unfilled]. [Home] : The patient, [unfilled], was located at home, [unfilled], at the time of the visit.

## 2024-03-11 ENCOUNTER — APPOINTMENT (OUTPATIENT)
Dept: OTOLARYNGOLOGY | Facility: CLINIC | Age: 59
End: 2024-03-11

## 2024-03-25 ENCOUNTER — RX RENEWAL (OUTPATIENT)
Age: 59
End: 2024-03-25

## 2024-03-26 ENCOUNTER — APPOINTMENT (OUTPATIENT)
Dept: INFECTIOUS DISEASE | Facility: CLINIC | Age: 59
End: 2024-03-26
Payer: MEDICARE

## 2024-03-26 VITALS
TEMPERATURE: 97.8 F | DIASTOLIC BLOOD PRESSURE: 84 MMHG | HEIGHT: 72 IN | SYSTOLIC BLOOD PRESSURE: 126 MMHG | OXYGEN SATURATION: 97 % | HEART RATE: 77 BPM

## 2024-03-26 DIAGNOSIS — K59.00 CONSTIPATION, UNSPECIFIED: ICD-10-CM

## 2024-03-26 DIAGNOSIS — Z01.00 ENCOUNTER FOR EXAMINATION OF EYES AND VISION W/OUT ABNORMAL FINDINGS: ICD-10-CM

## 2024-03-26 DIAGNOSIS — D49.2 NEOPLASM OF UNSPECIFIED BEHAVIOR OF BONE, SOFT TISSUE, AND SKIN: ICD-10-CM

## 2024-03-26 DIAGNOSIS — R22.9 LOCALIZED SWELLING, MASS AND LUMP, UNSPECIFIED: ICD-10-CM

## 2024-03-26 DIAGNOSIS — N13.2 HYDRONEPHROSIS WITH RENAL AND URETERAL CALCULOUS OBSTRUCTION: ICD-10-CM

## 2024-03-26 DIAGNOSIS — Z01.20 ENCOUNTER FOR DENTAL EXAMINATION AND CLEANING W/OUT ABNORMAL FINDINGS: ICD-10-CM

## 2024-03-26 PROCEDURE — 99215 OFFICE O/P EST HI 40 MIN: CPT

## 2024-03-26 RX ORDER — SENNOSIDES 8.6 MG/1
8.6 TABLET, COATED ORAL
Qty: 180 | Refills: 2 | Status: ACTIVE | COMMUNITY
Start: 2024-01-10 | End: 1900-01-01

## 2024-03-26 RX ORDER — SENNOSIDES 8.6 MG TABLETS 8.6 MG/1
8.6 TABLET ORAL
Qty: 180 | Refills: 2 | Status: COMPLETED | COMMUNITY
Start: 2023-04-05 | End: 2024-03-26

## 2024-03-26 RX ORDER — BICTEGRAVIR SODIUM, EMTRICITABINE, AND TENOFOVIR ALAFENAMIDE FUMARATE 50; 200; 25 MG/1; MG/1; MG/1
50-200-25 TABLET ORAL
Qty: 90 | Refills: 2 | Status: ACTIVE | COMMUNITY
Start: 2022-09-20 | End: 1900-01-01

## 2024-03-26 RX ORDER — SEMAGLUTIDE 1.34 MG/ML
4 INJECTION, SOLUTION SUBCUTANEOUS
Qty: 1 | Refills: 3 | Status: ACTIVE | COMMUNITY
Start: 2024-03-26

## 2024-03-26 RX ORDER — GLYCERIN 2 G/1
2 SUPPOSITORY RECTAL
Qty: 14 | Refills: 1 | Status: ACTIVE | COMMUNITY
Start: 2023-04-05 | End: 1900-01-01

## 2024-03-26 RX ORDER — CEFUROXIME AXETIL 500 MG/1
500 TABLET ORAL
Qty: 20 | Refills: 0 | Status: ACTIVE | COMMUNITY
Start: 2024-03-26

## 2024-03-26 RX ORDER — ETHACRYNIC ACID 25 MG/1
25 TABLET ORAL DAILY
Refills: 0 | Status: ACTIVE | COMMUNITY
Start: 2022-07-16

## 2024-03-26 RX ORDER — LIRAGLUTIDE 6 MG/ML
18 INJECTION SUBCUTANEOUS DAILY
Qty: 1 | Refills: 3 | Status: COMPLETED | COMMUNITY
Start: 2022-08-11 | End: 2024-03-26

## 2024-03-27 LAB
ALBUMIN SERPL ELPH-MCNC: 4.3 G/DL
ALP BLD-CCNC: 41 U/L
ALT SERPL-CCNC: 7 U/L
ANION GAP SERPL CALC-SCNC: 11 MMOL/L
AST SERPL-CCNC: 10 U/L
BILIRUB SERPL-MCNC: 0.3 MG/DL
BUN SERPL-MCNC: 31 MG/DL
CALCIUM SERPL-MCNC: 10.4 MG/DL
CD3 CELLS # BLD: 972 CELLS/UL
CD3 CELLS NFR BLD: 76 %
CD3+CD4+ CELLS # BLD: 386 CELLS/UL
CD3+CD4+ CELLS NFR BLD: 30 %
CD3+CD4+ CELLS/CD3+CD8+ CLL SPEC: 0.69 RATIO
CD3+CD8+ CELLS # SPEC: 558 CELLS/UL
CD3+CD8+ CELLS NFR BLD: 44 %
CHLORIDE SERPL-SCNC: 100 MMOL/L
CO2 SERPL-SCNC: 28 MMOL/L
CREAT SERPL-MCNC: 2.13 MG/DL
EGFR: 35 ML/MIN/1.73M2
GLUCOSE SERPL-MCNC: 214 MG/DL
HCT VFR BLD CALC: 40 %
HGB BLD-MCNC: 12.1 G/DL
HIV1 RNA # SERPL NAA+PROBE: ABNORMAL
HIV1 RNA # SERPL NAA+PROBE: ABNORMAL COPIES/ML
MCHC RBC-ENTMCNC: 25.3 PG
MCHC RBC-ENTMCNC: 30.3 GM/DL
MCV RBC AUTO: 83.5 FL
PLATELET # BLD AUTO: 330 K/UL
POTASSIUM SERPL-SCNC: 4.9 MMOL/L
PROT SERPL-MCNC: 8 G/DL
RBC # BLD: 4.79 M/UL
RBC # FLD: 16.3 %
SODIUM SERPL-SCNC: 139 MMOL/L
VIRAL LOAD INTERP: NORMAL
VIRAL LOAD LOG: ABNORMAL LG COP/ML
WBC # FLD AUTO: 8.56 K/UL

## 2024-03-28 PROBLEM — R22.9 SKIN MASS: Status: ACTIVE | Noted: 2023-12-12

## 2024-03-28 PROBLEM — D49.2 ABNORMAL SKIN GROWTH: Status: ACTIVE | Noted: 2023-12-12

## 2024-03-28 PROBLEM — K59.00 CONSTIPATION IN MALE: Status: ACTIVE | Noted: 2023-04-05

## 2024-03-28 NOTE — HISTORY OF PRESENT ILLNESS
[FreeTextEntry1] : 57 yo male here for HIV f/u consult in the company of nephew taking Biktarvy daily with no missed doses or SE  noted with current UTI and possible prostate infection  was started on Cefuroxine.   noted with blood clots and white mucoid discharge along with painful urination  just started the abx yesterday - took total of 3 doses so far.   Pt f/u with nephrology 2/7/2024 was told noted with left renal calculi and severe parenchymal thinning.   was advised to f/u with Urology for possible recommendation of left kidney removal.   has not f/u with ENT for hearing loss  pt f/u with surgeon for skin growth on thigh growth was removed and was benign  pt pending cardiology consult for hx of A flutter  would like referral to GI for colonoscopy   From previous consult 12/12/2023 :  57 yo male here for HIV annual consult in the company of care giver, Cole taking Biktarvy daily with no missed doses or SE has not done blood work since 8/2022 - states does blood draws with home care lab results were not forwarded to this office.  went to Urology due to lower GFR imaging done and noted various stones - one of 1.7 x 1.2cm size also noted Parenchymal thinning or atrophy. Renal function study was done and on left kidney noted no spontaneous net clearance and a flat plateau clearance curve. Urology recommended possible left nephrectomy if pt becomes symptomatic has not consulted with nephrology  also noted with lesion left posterior thigh was told by urology could possibly be Kaposi's Sarcoma. pt concerned that this may be a signal of worsening HIV status.  Pt continues with memory loss / forgetfulness pt f/u with neurology and had EEG done did not f/u for results of work up  also continues with intermittent vertigo and falls confirms hearing loss has not consulted with ENT for this.  Has large growth in right axillary area has gone to dermatology previously and told needs surgery consult.     From previous consult 4/5/2023 : This telephonic visit was provided via audio only technology. The patient, HEMAL YOUNG, was located at home, [address] [place] [address] , at the time of the visit. The provider, ANNETTE GILMORE, was located at [place], [place] at the time of the visit. The patient, HEMAL YOUNG and Provider participated in the telephonic visit. Verbal consent for telephonic services was given on April 5, 2023 by the patient, HEMAL YOUNG. 58 yo male video called for telemed HIV f/u consult taking Biktarvy daily with no missed doses or SE  Last night had chills and felt feverish with generalized body aches denies any other symptoms today awoke better had home nurse visit and felt it was due to neurological symptoms and to f/u with neurology  he is having memory difficulty, word finding he is also having vertigo fell last week and hit left lateral abdomen and back has appt with neurology 4/18/2023.  he is also having increased constipation symptoms taking Colace with minimal results.  Pt states would like refill of testosterone medication as has hx of low testosterone on blood work.     3/28/2024 Plan  HIV  counselled on switching ARV regimen to Juluca to help preserve renal function.  Counselled on SE, risks, benefits, dosing.  Pt verbalized understanding 1. stop Biktary  2. start Juluca one tab daily  3. do blood work  4. f/u one month   Skin Growth pt f/u with dermatology and had skin growth removed from posterior thigh- was benign resolved   Aflutter  1. f/u with cardiology as recommended   UTI  1. complete Cefuroxime treatment  2. f/u with Nephrology as scheduled   Hydronephrosis 1. referral to Urology given as per nephrology recommendations  for possible left nephrectomy- copy of referral given today  Skin Mass  pt has not f/u with surgeon for removal of golf ball sized mass under right arm.   1. f/u with surgeon as recommended - copy of referral given today  Hearing loss  1. f/u with ENT as recommended for further evaluation - copy of referral given today    Constipation in male  1. copy of referral given today   Administered By (Optional): Karli CA

## 2024-03-28 NOTE — ASSESSMENT
[Treatment Adherence] : treatment adherence [Treatment Education] : treatment education [Rx Dose / Side Effects] : Rx dose/side effects [Medical Care Issues] : medical care issues [Drug Interactions / Side Effects] : drug interactions/side effects [FreeTextEntry1] : HIV f/u consult.  HIV stable   3/28/2024 Plan  HIV  counselled on switching ARV regimen to Juluca to help preserve renal function.  Counselled on SE, risks, benefits, dosing.  Pt verbalized understanding 1. stop Biktary  2. start Juluca one tab daily  3. do blood work  4. f/u one month   Skin Growth pt f/u with dermatology and had skin growth removed from posterior thigh- was benign resolved   Aflutter  1. f/u with cardiology as recommended   UTI  1. complete Cefuroxime treatment  2. f/u with Nephrology as scheduled   Hydronephrosis 1. referral to Urology given as per nephrology recommendations  for possible left nephrectomy- copy of referral given today  Skin Mass  pt has not f/u with surgeon for removal of golf ball sized mass under right arm.   1. f/u with surgeon as recommended - copy of referral given today  Hearing loss  1. f/u with ENT as recommended for further evaluation - copy of referral given today    Constipation in male  1. copy of referral given today

## 2024-03-28 NOTE — PHYSICAL EXAM
[General Appearance - Alert] : alert [General Appearance - In No Acute Distress] : in no acute distress [General Appearance - Well Nourished] : well nourished [PERRL With Normal Accommodation] : pupils were equal in size, round, reactive to light [Sclera] : the sclera and conjunctiva were normal [Extraocular Movements] : extraocular movements were intact [Outer Ear] : the ears and nose were normal in appearance [Examination Of The Oral Cavity] : the lips and gums were normal [Both Tympanic Membranes Were Examined] : both tympanic membranes were normal [Oropharynx] : the oropharynx was normal with no thrush [Neck Appearance] : the appearance of the neck was normal [Jugular Venous Distention Increased] : there was no jugular-venous distention [Neck Cervical Mass (___cm)] : no neck mass was observed [Thyroid Diffuse Enlargement] : the thyroid was not enlarged [Exaggerated Use Of Accessory Muscles For Inspiration] : no accessory muscle use [Respiration, Rhythm And Depth] : normal respiratory rhythm and effort [Auscultation Breath Sounds / Voice Sounds] : lungs were clear to auscultation bilaterally [Heart Sounds] : normal S1 and S2 [Heart Rate And Rhythm] : heart rate was normal and rhythm regular [Murmurs] : no murmurs [Heart Sounds Gallop] : no gallops [Heart Sounds Pericardial Friction Rub] : no pericardial rub [Edema] : there was no peripheral edema [Bowel Sounds] : normal bowel sounds [Abdomen Tenderness] : non-tender [Abdomen Soft] : soft [Costovertebral Angle Tenderness] : no CVA tenderness [No Palpable Adenopathy] : no palpable adenopathy [Musculoskeletal - Swelling] : no joint swelling [Nail Clubbing] : no clubbing  or cyanosis of the fingernails [Range of Motion to Joints] : range of motion to joints [] : no rash [Skin Color & Pigmentation] : normal skin color and pigmentation [Skin Lesions] : no skin lesions [Cranial Nerves] : cranial nerves 2-12 were intact [Oriented To Time, Place, And Person] : oriented to person, place, and time [Affect] : the affect was normal [FreeTextEntry1] : forgetful.

## 2024-03-28 NOTE — REASON FOR VISIT
[Follow-Up: _____] : a [unfilled] follow-up visit [Family Member] : family member [FreeTextEntry1] : HIV f/u consult

## 2024-04-03 ENCOUNTER — APPOINTMENT (OUTPATIENT)
Dept: PSYCHIATRY | Facility: HOSPITAL | Age: 59
End: 2024-04-03

## 2024-04-03 PROCEDURE — 99212 OFFICE O/P EST SF 10 MIN: CPT | Mod: 1L,95

## 2024-04-03 NOTE — DISCUSSION/SUMMARY
[FreeTextEntry1] : Pt is a 57 yr old male with PMH HIV (since late 1980's, well-controlled, on HAART), anxiety, and chronic peripheral neuropathy (HIV-related), formerly in care with Dr. Yeung (pain mgmt) followed by Dr. Hawley (palliative). Referred to my practice for continuation of chronic pain treatment/opioid management. The patient suffers from chronic, debilitating neuropathy and has been partially-controlled with chronic opioid therapy without evidence of severe side effects or addiction/misuse. As such, the benefits from continuing chronic opioid therapy currently outweigh the risks with appropriate monitoring.  Plan: - C/W Methadone 10mg 3 tabs in AM, 3 tabs in PM, and 4 tabs in HS (total 10/day); Rx refilled - C/W Oxycodone 30mg 1-2 tabs 3-5/day (total 8/day, N= 240, previously at 270); Rx refilled - C/W Lyrica 150mg BID; Rx refilled. - Will renew medications q 30 days - Will explore possibility of antidepressant with neuropathy potential (SNRI, etc) with collaborating psychiatrist - Will have virtual f/u with patient PRN

## 2024-04-03 NOTE — HISTORY OF PRESENT ILLNESS
[FreeTextEntry1] : Pt is a 57 yr old male with PMH HIV (since late 1980's, well-controlled, on HAART), anxiety, and chronic peripheral neuropathy (HIV-related), formerly in care with Dr. Yeung (pain mgmt) followed by Dr. Hawley (palliative). Referred to my practice for continuation of chronic pain treatment/opioid management.  Interval Hx: Chart reviewed beforehand. Pt seen virtually. Pleasant, cooperative, AAO X3 with no overt confusion/sedation. Reviewed how he has been since reducing Oxycodone PRN and increasing standing methadone at last visit 1 month ago. Reports pain slightly improved by standing methadone initially but also struggles with breakthrough pain and feels 1-2 tablets of Oxycodone PRN are not sufficient. Denied side effects from current opioid regimen. Continues to cite poor QOL/high pain and low energy, budgeting himself throughout the day and avoiding much activity. Also notes he is fighting recurrent UTIs (currently on ABX) and possibly facing a future nephrectomy. Asking if opioids could be increased, once again citing that in years past he was on Methadone 160mg/day and Oxycodone "300's" mg/day and yet now on much less of each. Explained that I was not comfortable with further increases particularly as he ages, faces more medical concerns, etc.   Discussed possibility of reduction over time/gradually to reduce reliance on opioids, advising of even possibly using suboxone in that regard. He was hesitant. Agreed to stay at this dose. Also explored possibility of an SNRI/atypical antidepressant for neuropathy/depression which I will review with his psychiatrist Dr. Gonzalez (pt is currently on Wellbutrin). Pt agreed with this collaboration.

## 2024-04-03 NOTE — REASON FOR VISIT
[Telehealth (audio & video) - Individual/Group] : This visit was provided via telehealth using real-time 2-way audio visual technology. [Access issues (e.g., transportation, impaired mobility, etc.)] : due to patient's access issues [Medical Office: ([facility])___] : The provider was located at the medical office in [unfilled]. [Home] : The patient, [unfilled], was located at home, [unfilled], at the time of the visit. [Patient] : Patient [FreeTextEntry1] : chronic pain

## 2024-04-03 NOTE — PHYSICAL EXAM
[FreeTextEntry5] : deferred, virtual format [Cooperative] : cooperative [Euthymic] : euthymic [Clear] : clear [Full] : full [Linear/Goal Directed] : linear/goal directed [Average] : average [WNL] : within normal limits

## 2024-04-09 NOTE — DISCUSSION/SUMMARY
Dm supplies    [FreeTextEntry1] : Pt is a 57 yr old male with PMH HIV (since late 1980's, well-controlled, on HAART), anxiety, and chronic peripheral neuropathy (HIV-related), formerly in care with Dr. Yeung (pain mgmt) followed by Dr. Hawley (palliative). Referred to my practice for continuation of chronic pain treatment/opioid management. The patient suffers from chronic, debilitating neuropathy and has been partially-controlled with chronic opioid therapy without evidence of severe side effects or addiction/misuse. As such, the benefits from continuing chronic opioid therapy currently outweigh the risks with appropriate monitoring.  Plan: - INCREASE Methadone 10mg to 3 tabs in AM, 3 tabs in PM, and 4 tabs in HS (total 10/day); Rx refilled - DECREASE Oxycodone 30mg to 1-2 tabs 3-5/day (total 8/day, N= 240, previously at 270); Rx refilled - Continue Lyrica 150mg BID; Rx refilled. - Will renew medications q 30 days - Will have virtual f/u with patient PRN

## 2024-04-15 ENCOUNTER — NON-APPOINTMENT (OUTPATIENT)
Age: 59
End: 2024-04-15

## 2024-04-16 ENCOUNTER — APPOINTMENT (OUTPATIENT)
Dept: INFECTIOUS DISEASE | Facility: CLINIC | Age: 59
End: 2024-04-16

## 2024-04-16 ENCOUNTER — APPOINTMENT (OUTPATIENT)
Dept: INFECTIOUS DISEASE | Facility: CLINIC | Age: 59
End: 2024-04-16
Payer: MEDICARE

## 2024-04-16 PROCEDURE — 99213 OFFICE O/P EST LOW 20 MIN: CPT

## 2024-04-17 ENCOUNTER — APPOINTMENT (OUTPATIENT)
Dept: CARDIOLOGY | Facility: CLINIC | Age: 59
End: 2024-04-17
Payer: MEDICARE

## 2024-04-17 ENCOUNTER — NON-APPOINTMENT (OUTPATIENT)
Age: 59
End: 2024-04-17

## 2024-04-17 VITALS
RESPIRATION RATE: 14 BRPM | SYSTOLIC BLOOD PRESSURE: 120 MMHG | HEART RATE: 66 BPM | BODY MASS INDEX: 42.66 KG/M2 | DIASTOLIC BLOOD PRESSURE: 80 MMHG | OXYGEN SATURATION: 93 % | HEIGHT: 72 IN | WEIGHT: 315 LBS

## 2024-04-17 DIAGNOSIS — F17.290 NICOTINE DEPENDENCE, OTHER TOBACCO PRODUCT, UNCOMPLICATED: ICD-10-CM

## 2024-04-17 DIAGNOSIS — N18.30 CHRONIC KIDNEY DISEASE, STAGE 3 UNSPECIFIED: ICD-10-CM

## 2024-04-17 DIAGNOSIS — R06.02 SHORTNESS OF BREATH: ICD-10-CM

## 2024-04-17 DIAGNOSIS — R42 DIZZINESS AND GIDDINESS: ICD-10-CM

## 2024-04-17 DIAGNOSIS — I10 ESSENTIAL (PRIMARY) HYPERTENSION: ICD-10-CM

## 2024-04-17 DIAGNOSIS — I50.32 CHRONIC DIASTOLIC (CONGESTIVE) HEART FAILURE: ICD-10-CM

## 2024-04-17 DIAGNOSIS — E11.9 TYPE 2 DIABETES MELLITUS W/OUT COMPLICATIONS: ICD-10-CM

## 2024-04-17 DIAGNOSIS — E78.5 HYPERLIPIDEMIA, UNSPECIFIED: ICD-10-CM

## 2024-04-17 DIAGNOSIS — Z72.3 LACK OF PHYSICAL EXERCISE: ICD-10-CM

## 2024-04-17 DIAGNOSIS — R07.9 CHEST PAIN, UNSPECIFIED: ICD-10-CM

## 2024-04-17 DIAGNOSIS — I48.92 UNSPECIFIED ATRIAL FLUTTER: ICD-10-CM

## 2024-04-17 DIAGNOSIS — R00.2 PALPITATIONS: ICD-10-CM

## 2024-04-17 PROCEDURE — G2211 COMPLEX E/M VISIT ADD ON: CPT

## 2024-04-17 PROCEDURE — 93000 ELECTROCARDIOGRAM COMPLETE: CPT

## 2024-04-17 PROCEDURE — 99205 OFFICE O/P NEW HI 60 MIN: CPT

## 2024-04-25 ENCOUNTER — NON-APPOINTMENT (OUTPATIENT)
Age: 59
End: 2024-04-25

## 2024-04-25 ENCOUNTER — APPOINTMENT (OUTPATIENT)
Dept: SURGERY | Facility: CLINIC | Age: 59
End: 2024-04-25
Payer: MEDICARE

## 2024-04-25 ENCOUNTER — LABORATORY RESULT (OUTPATIENT)
Age: 59
End: 2024-04-25

## 2024-04-25 VITALS
BODY MASS INDEX: 42.72 KG/M2 | DIASTOLIC BLOOD PRESSURE: 72 MMHG | HEART RATE: 70 BPM | SYSTOLIC BLOOD PRESSURE: 114 MMHG | OXYGEN SATURATION: 90 % | TEMPERATURE: 97.8 F | WEIGHT: 315 LBS | RESPIRATION RATE: 15 BRPM

## 2024-04-25 PROCEDURE — 99204 OFFICE O/P NEW MOD 45 MIN: CPT

## 2024-04-25 PROCEDURE — 21930 EXC BACK LES SC < 3 CM: CPT

## 2024-04-25 PROCEDURE — 21552 EXC NECK LES SC 3 CM/>: CPT

## 2024-04-29 ENCOUNTER — APPOINTMENT (OUTPATIENT)
Dept: INFECTIOUS DISEASE | Facility: CLINIC | Age: 59
End: 2024-04-29

## 2024-04-30 ENCOUNTER — NON-APPOINTMENT (OUTPATIENT)
Age: 59
End: 2024-04-30

## 2024-04-30 DIAGNOSIS — B20 HUMAN IMMUNODEFICIENCY VIRUS [HIV] DISEASE: ICD-10-CM

## 2024-05-01 ENCOUNTER — LABORATORY RESULT (OUTPATIENT)
Age: 59
End: 2024-05-01

## 2024-05-01 NOTE — ASSESSMENT
[FreeTextEntry1] :  Mr. YOUNG is a 58 year man with large right axillary mass and mid back mass. We discussed the options cautery removal.  The risks/benefits and alternatives were discussed at length and all questions were answered. Informed consent was obtained.  Right axilla was was prepped/draped. After region was anesthetized, the exophytic mass was excised with cautery. Skin was closed with 3-0 nylon suture x 2. Dressing applied.   Mid back was was prepped/draped. After region was anesthetized, the exophytic mass was excised with cautery. Dressing applied.   Specimens sent to pathology.  Patient educated on wound care and dressing changes; supplies given to patient.

## 2024-05-01 NOTE — PHYSICAL EXAM
[Normal Thyroid] : the thyroid was normal [Alert] : alert [Oriented to Person] : oriented to person [Oriented to Place] : oriented to place [Oriented to Time] : oriented to time [Calm] : calm [JVD] : no jugular venous distention  [de-identified] :  No acute distress [de-identified] :  No respiratory distress [de-identified] :  Regular rate [de-identified] : soft, nontender. no rebound or guarding. [de-identified] :  normal range of motion [de-identified] : right arm pit mass 1inch x 2inch, mid back mass 2cm x 1cm

## 2024-05-01 NOTE — HISTORY OF PRESENT ILLNESS
[de-identified] : Mr. YOUNG is a 58 year old man with a right armpit mass and a mid back mass , which has been present since 2010 , presents for evaluation.  Denies pain, fever/chills/nausea/emesis or changes in bowel or bladder habits. Tolerating diet. Normal bowel movements.

## 2024-05-02 ENCOUNTER — LABORATORY RESULT (OUTPATIENT)
Age: 59
End: 2024-05-02

## 2024-05-09 ENCOUNTER — APPOINTMENT (OUTPATIENT)
Dept: SURGERY | Facility: CLINIC | Age: 59
End: 2024-05-09
Payer: MEDICARE

## 2024-05-09 VITALS
OXYGEN SATURATION: 98 % | DIASTOLIC BLOOD PRESSURE: 68 MMHG | SYSTOLIC BLOOD PRESSURE: 106 MMHG | RESPIRATION RATE: 16 BRPM | HEIGHT: 72 IN | TEMPERATURE: 98.2 F | HEART RATE: 104 BPM

## 2024-05-09 PROCEDURE — 99214 OFFICE O/P EST MOD 30 MIN: CPT | Mod: 24

## 2024-05-10 NOTE — HISTORY OF PRESENT ILLNESS
[de-identified] : Mr. YOUNG is a 58-year-old man status post cautery removal of right axillary mass and mid back mass. Denies pain, fever/chills/nausea/emesis or changes in bowel or bladder habits. Tolerating diet. Normal bowel movements.  Pathology reviewed

## 2024-05-10 NOTE — PHYSICAL EXAM
[Normal Thyroid] : the thyroid was normal [Alert] : alert [Oriented to Person] : oriented to person [Oriented to Place] : oriented to place [Oriented to Time] : oriented to time [Calm] : calm [JVD] : no jugular venous distention  [de-identified] :  No acute distress [de-identified] :  No respiratory distress [de-identified] :  Regular rate [de-identified] : soft, nontender. no rebound or guarding. [de-identified] :  normal range of motion [de-identified] : right axillary cautery site with sutures, mid back cautery site well healed.

## 2024-05-10 NOTE — ASSESSMENT
[FreeTextEntry1] : This is a 58 year old male who presents to office today for suture removal. Sutures removed from right axilla site, healing well. Mid back healed. Patient instructed to keep both sites clean and dry until completely healed. Patient verbalized understanding.

## 2024-05-21 ENCOUNTER — APPOINTMENT (OUTPATIENT)
Dept: INFECTIOUS DISEASE | Facility: CLINIC | Age: 59
End: 2024-05-21

## 2024-05-21 ENCOUNTER — APPOINTMENT (OUTPATIENT)
Dept: INFECTIOUS DISEASE | Facility: CLINIC | Age: 59
End: 2024-05-21
Payer: MEDICARE

## 2024-05-21 PROCEDURE — 99213 OFFICE O/P EST LOW 20 MIN: CPT

## 2024-05-22 ENCOUNTER — RX RENEWAL (OUTPATIENT)
Age: 59
End: 2024-05-22

## 2024-05-28 ENCOUNTER — NON-APPOINTMENT (OUTPATIENT)
Age: 59
End: 2024-05-28

## 2024-05-28 ENCOUNTER — APPOINTMENT (OUTPATIENT)
Dept: OTOLARYNGOLOGY | Facility: CLINIC | Age: 59
End: 2024-05-28
Payer: MEDICARE

## 2024-05-28 VITALS
HEIGHT: 73 IN | SYSTOLIC BLOOD PRESSURE: 124 MMHG | WEIGHT: 315 LBS | DIASTOLIC BLOOD PRESSURE: 75 MMHG | BODY MASS INDEX: 41.75 KG/M2 | HEART RATE: 69 BPM

## 2024-05-28 DIAGNOSIS — E11.40 TYPE 2 DIABETES MELLITUS WITH DIABETIC NEUROPATHY, UNSPECIFIED: ICD-10-CM

## 2024-05-28 DIAGNOSIS — J30.89 OTHER ALLERGIC RHINITIS: ICD-10-CM

## 2024-05-28 DIAGNOSIS — G47.30 SLEEP APNEA, UNSPECIFIED: ICD-10-CM

## 2024-05-28 DIAGNOSIS — Z80.1 FAMILY HISTORY OF MALIGNANT NEOPLASM OF TRACHEA, BRONCHUS AND LUNG: ICD-10-CM

## 2024-05-28 DIAGNOSIS — J34.2 DEVIATED NASAL SEPTUM: ICD-10-CM

## 2024-05-28 DIAGNOSIS — Z80.8 FAMILY HISTORY OF MALIGNANT NEOPLASM OF OTHER ORGANS OR SYSTEMS: ICD-10-CM

## 2024-05-28 DIAGNOSIS — R42 DIZZINESS AND GIDDINESS: ICD-10-CM

## 2024-05-28 DIAGNOSIS — H91.90 UNSPECIFIED HEARING LOSS, UNSPECIFIED EAR: ICD-10-CM

## 2024-05-28 PROCEDURE — 99204 OFFICE O/P NEW MOD 45 MIN: CPT

## 2024-05-28 PROCEDURE — 92557 COMPREHENSIVE HEARING TEST: CPT

## 2024-05-28 PROCEDURE — 92550 TYMPANOMETRY & REFLEX THRESH: CPT

## 2024-05-28 RX ORDER — TRAZODONE HYDROCHLORIDE 50 MG/1
50 TABLET ORAL
Qty: 30 | Refills: 1 | Status: ACTIVE | COMMUNITY
Start: 2023-05-30 | End: 1900-01-01

## 2024-05-28 RX ORDER — BUPROPION HYDROCHLORIDE 150 MG/1
150 TABLET, EXTENDED RELEASE ORAL DAILY
Qty: 90 | Refills: 0 | Status: ACTIVE | COMMUNITY
Start: 2023-01-10 | End: 1900-01-01

## 2024-05-28 NOTE — PHYSICAL EXAM
[Hearing Vee Test (Tuning Fork On Forehead)] : no lateralization of tone [] : septum deviated to the left [Midline] : trachea located in midline position [Normal] : no nystagmus [Hearing Loss Right Only] : normal [Hearing Loss Left Only] : normal [FreeTextEntry8] : minimal cerumen removed via curettage [FreeTextEntry9] : minimal cerumen removed via curettage [de-identified] : inflamed and allergic turbinates [de-identified] : uvula sitting on the BOT. type 3 oral cavity [de-identified] : pupils are equal and reactive

## 2024-05-28 NOTE — ADDENDUM
[FreeTextEntry1] :  Documented by Mario Alberto Gorman acting as scribe for Dr. Huizar on 05/28/2024. All Medical record entries made by the Scribe were at my, Dr. Huizar, direction and personally dictated by me on 05/28/2024 . I have reviewed the chart and agree that the record accurately reflects my personal performance of the history, physical exam, assessment and plan. I have also personally directed, reviewed, and agreed with the discharge instructions.

## 2024-05-28 NOTE — ASSESSMENT
[FreeTextEntry1] : Reviewed and reconciled medications, allergies, PMHx, PSHx, SocHx, FMHx.  Patient presents today stating that he has had decreased hearing in both of his ears and a clogged sensation in his ears. He also states that he has been experiencing lightheadedness and vertigo for the past year. He denies tinnitus. He states that his dizziness can happen when sitting, laying down, or standing up. He states that he has MESSI, but he does not use CPAP machine because he could not tolerate it. He states that he falls backwards when he stands up straight. He states that he uses a walker for diabetic neuropathy.   Physical exam: -right ear canal: minimal cerumen removed via curettage -left ear canal: minimal cerumen removed via curettage -tuning forks lateralizes to the right ear -deviated septum left -inflamed and allergic turbinates -uvula sitting on the BOT -type 3 oral cavity -vertical head roll: dizziness worse with eyes open -horizontal head roll: negative -Romberg: positive -pupils are equal and reactive -no nystagmus  Audio 5/28/24: -Tymps: Type As/c AD; Type Ad AS -Borderline WNL to a mild SNHL 250-8000 Hz AU -right ear pressure: -189 -left ear pressure: -19 -100% discrimination in the right ear and 96% discrimination in the left ear at 60 dB   Plan:  Audio - results interpreted by Dr. Huizar and reviewed with the patient. -Consider amplification -Ordered VNG -FU with results from VNG

## 2024-05-28 NOTE — HISTORY OF PRESENT ILLNESS
[de-identified] : Patient presents today stating that he has had decreased hearing in both of his ears and a clogged sensation in his ears. He also states that he has been experiencing lightheadedness and vertigo for the past year. He denies tinnitus. He states that his dizziness can happen when sitting, laying down, or standing up. He states that he has MESSI, but he does not use CPAP machine because he could not tolerate it. He states that he falls backwards when he stands up straight. He states that he uses a walker for diabetic neuropathy.

## 2024-05-28 NOTE — CONSULT LETTER
[Dear  ___] : Dear  [unfilled], [Consult Letter:] : I had the pleasure of evaluating your patient, [unfilled]. [Please see my note below.] : Please see my note below. [Consult Closing:] : Thank you very much for allowing me to participate in the care of this patient.  If you have any questions, please do not hesitate to contact me. [Sincerely,] : Sincerely, [FreeTextEntry3] :  William Huizar MD FACS

## 2024-05-28 NOTE — REVIEW OF SYSTEMS
[Hearing Loss] : hearing loss [Lightheadedness] : lightheadedness [Problem Snoring] : problem snoring [Snoring With Pauses] : snoring with pauses [Shortness Of Breath] : shortness of breath [Heartburn] : heartburn [Anxiety] : anxiety [Depression] : depression [Negative] : Heme/Lymph [FreeTextEntry7] : reflux [de-identified] : headache [FreeTextEntry1] : fatigue

## 2024-05-29 ENCOUNTER — APPOINTMENT (OUTPATIENT)
Dept: PSYCHIATRY | Facility: HOSPITAL | Age: 59
End: 2024-05-29

## 2024-05-30 RX ORDER — DOLUTEGRAVIR SODIUM AND RILPIVIRINE HYDROCHLORIDE 50; 25 MG/1; MG/1
50-25 TABLET, FILM COATED ORAL
Qty: 30 | Refills: 4 | Status: ACTIVE | COMMUNITY
Start: 2024-05-30 | End: 1900-01-01

## 2024-05-30 NOTE — HISTORY OF PRESENT ILLNESS
[FreeTextEntry1] : Patient is a 59yo M with history of atrial flutter s/p ablation by Dr Vee in 2018, DM with nephropathy, HTN, HLD, HIV, renal calculi,  CKD, chronic HFpEF, hearing loss here for cardiac evaluation. Struggles with dizziness, can happen at rest. Will fall over to side but doesn't pass out. No vertigo and doesn't realize what's happening until he starts falling. Going past couple years. Also looking at need for procedure for renal calculi, ? need for nephrectomy (Sees Dr Hoenig).    Walks minimally, uses motorized scooter. Will use walker with seat. Limited by balance, gets short of breath with short distances. On O2 at home he states. Will get dyspnea with short distances. MOstly sedentary however. GEts intermittent pains in chest, feels like a pin pressing his chest. Will come and go sporadically. LEgs swell intermittently. No PND/orthopnea/syncope.   Here with Step brother today who he lives with. Looking into low income housing now as well. On SSI since 1990 initially for his HIV.   ROS: Struggles with frequent urination, GI negative

## 2024-05-30 NOTE — ASSESSMENT
[FreeTextEntry1] : ECG: SR, no significant ST-T abnormalities and normal intervals   HDL 42 LDL 62  (12/2023)  Na 139 K 4.9  CO2 28 BUN 31 Creat 2.13 GFR 35 (3/2024)  BUN 26 Creat 1.73 (12/2023)  A1C 8.5 (12/2023)

## 2024-05-30 NOTE — PHYSICAL EXAM
[Normal] : clear lung fields, good air entry, no respiratory distress [Moves all extremities] : moves all extremities [Alert and Oriented] : alert and oriented [de-identified] : obese

## 2024-05-30 NOTE — DISCUSSION/SUMMARY
[EKG obtained to assist in diagnosis and management of assessed problem(s)] : EKG obtained to assist in diagnosis and management of assessed problem(s) [FreeTextEntry1] : Patient is a 59yo M with history of atrial flutter s/p ablation in 2018 with Dr Vee, DM, HTN, HLD, HIV with neuropathy, renal calculi,  chronic HFpEF, CKD from diabetic nephropathy, MESSI unable to tolerated CPAP, bipolar, morbid obesity, hearing loss here for cardiac evaluation.  -ECG unremarkable today -BP controlled and lipids at goal on current statin dose from BW 12/2023 -NO longer on A/C due to hematuria and recurrent falling as well. HAd ablation and no signs recurrent AFL but [id number] = 3. Ultimately will consider Watchman but has other more pressing issues to address at this time as looking at surgery for renal calculi with possible need for nephrectomy.  -Has history HFpEF, appears well compensated on exam although limited, Taking ethacrynic acid and dose cut by nephro recently. Has sulfa allergy. Will continue current dose. On Jardiance already as well.  -Has significant dyspnea and weakness, which alot is from weight/deconditioning and likely restrictive lung disease with MESSI. Not on CPAP at this time. Should follow up with pulm   1. Echo to evaluate his HFpEF/dyspnea and pre-operatively . Also needs ischemic evaluation due to RF, dyspnea, limited mobility. CP is atypical and less likely cardiac.  2. Regular PMD follow up and with mulitple specialists. Also recommend pulm evaluation  3. Diabetes management per PMD. Counselled on diet/weight loss , continue Jargiance 4. 30 day MCOT to evaluate PAF/flutter, can consider Watchman but hold off at this time 5. Recommend aggressive diet and lifestyle modifications  6. Continue GLP1 agonist 7. continue current dose diuretic, progressive renal dysfunction and cut back recently. Hold off ACEI/ARB unless ok per renal  8. Follow up after testing

## 2024-06-04 ENCOUNTER — NON-APPOINTMENT (OUTPATIENT)
Age: 59
End: 2024-06-04

## 2024-06-05 ENCOUNTER — APPOINTMENT (OUTPATIENT)
Dept: PSYCHIATRY | Facility: HOSPITAL | Age: 59
End: 2024-06-05

## 2024-06-12 ENCOUNTER — APPOINTMENT (OUTPATIENT)
Dept: CARDIOLOGY | Facility: CLINIC | Age: 59
End: 2024-06-12

## 2024-06-12 DIAGNOSIS — M62.81 MUSCLE WEAKNESS (GENERALIZED): ICD-10-CM

## 2024-06-13 ENCOUNTER — APPOINTMENT (OUTPATIENT)
Dept: CARDIOLOGY | Facility: CLINIC | Age: 59
End: 2024-06-13

## 2024-06-21 ENCOUNTER — NON-APPOINTMENT (OUTPATIENT)
Age: 59
End: 2024-06-21

## 2024-06-24 ENCOUNTER — NON-APPOINTMENT (OUTPATIENT)
Age: 59
End: 2024-06-24

## 2024-06-24 ENCOUNTER — RX RENEWAL (OUTPATIENT)
Age: 59
End: 2024-06-24

## 2024-06-24 RX ORDER — ALPRAZOLAM 2 MG/1
2 TABLET ORAL
Qty: 90 | Refills: 0 | Status: ACTIVE | COMMUNITY
Start: 2024-06-24 | End: 1900-01-01

## 2024-06-24 RX ORDER — ALPRAZOLAM 2 MG/1
2 TABLET ORAL
Qty: 90 | Refills: 0 | Status: COMPLETED | COMMUNITY
Start: 2018-03-21 | End: 2024-06-24

## 2024-06-26 ENCOUNTER — APPOINTMENT (OUTPATIENT)
Dept: CARDIOLOGY | Facility: CLINIC | Age: 59
End: 2024-06-26

## 2024-06-27 ENCOUNTER — APPOINTMENT (OUTPATIENT)
Dept: GASTROENTEROLOGY | Facility: CLINIC | Age: 59
End: 2024-06-27

## 2024-07-03 ENCOUNTER — APPOINTMENT (OUTPATIENT)
Dept: NEPHROLOGY | Facility: CLINIC | Age: 59
End: 2024-07-03
Payer: MEDICARE

## 2024-07-03 VITALS
HEIGHT: 73 IN | OXYGEN SATURATION: 96 % | WEIGHT: 315 LBS | DIASTOLIC BLOOD PRESSURE: 56 MMHG | HEART RATE: 70 BPM | TEMPERATURE: 96.1 F | BODY MASS INDEX: 41.75 KG/M2 | SYSTOLIC BLOOD PRESSURE: 102 MMHG

## 2024-07-03 DIAGNOSIS — N18.30 CHRONIC KIDNEY DISEASE, STAGE 3 UNSPECIFIED: ICD-10-CM

## 2024-07-03 PROCEDURE — 99214 OFFICE O/P EST MOD 30 MIN: CPT

## 2024-07-10 ENCOUNTER — APPOINTMENT (OUTPATIENT)
Dept: UROLOGY | Facility: CLINIC | Age: 59
End: 2024-07-10
Payer: MEDICARE

## 2024-07-10 ENCOUNTER — APPOINTMENT (OUTPATIENT)
Dept: PSYCHIATRY | Facility: HOSPITAL | Age: 59
End: 2024-07-10

## 2024-07-10 VITALS
BODY MASS INDEX: 41.75 KG/M2 | HEART RATE: 77 BPM | HEIGHT: 73 IN | DIASTOLIC BLOOD PRESSURE: 63 MMHG | SYSTOLIC BLOOD PRESSURE: 111 MMHG | OXYGEN SATURATION: 98 % | WEIGHT: 315 LBS | RESPIRATION RATE: 16 BRPM

## 2024-07-10 DIAGNOSIS — R39.9 UNSPECIFIED SYMPTOMS AND SIGNS INVOLVING THE GENITOURINARY SYSTEM: ICD-10-CM

## 2024-07-10 DIAGNOSIS — N13.2 HYDRONEPHROSIS WITH RENAL AND URETERAL CALCULOUS OBSTRUCTION: ICD-10-CM

## 2024-07-10 PROCEDURE — 99214 OFFICE O/P EST MOD 30 MIN: CPT

## 2024-07-10 RX ORDER — DOXYCYCLINE HYCLATE 100 MG/1
100 TABLET ORAL
Qty: 20 | Refills: 0 | Status: ACTIVE | COMMUNITY
Start: 2024-07-10 | End: 1900-01-01

## 2024-07-10 RX ORDER — SILODOSIN 4 MG/1
4 CAPSULE ORAL
Qty: 30 | Refills: 11 | Status: ACTIVE | COMMUNITY
Start: 2024-07-10 | End: 1900-01-01

## 2024-07-10 RX ORDER — LEVOFLOXACIN 500 MG/1
500 TABLET, FILM COATED ORAL DAILY
Qty: 10 | Refills: 0 | Status: ACTIVE | COMMUNITY
Start: 2024-07-10 | End: 1900-01-01

## 2024-07-11 ENCOUNTER — APPOINTMENT (OUTPATIENT)
Dept: OTOLARYNGOLOGY | Facility: CLINIC | Age: 59
End: 2024-07-11

## 2024-07-12 LAB
APPEARANCE: CLEAR
BACTERIA UR CULT: NORMAL
BILIRUBIN URINE: NEGATIVE
BLOOD URINE: ABNORMAL
COLOR: YELLOW
EPITHELIAL CELLS: 2 /HPF
GLUCOSE QUALITATIVE U: >=1000 MG/DL
KETONES URINE: NEGATIVE MG/DL
LEUKOCYTE ESTERASE URINE: ABNORMAL
NITRITE URINE: NEGATIVE
PH URINE: 6
PROTEIN URINE: 100 MG/DL
RED BLOOD CELLS URINE: 80 /HPF
SPECIFIC GRAVITY URINE: 1.03
UROBILINOGEN URINE: 0.2 MG/DL
WHITE BLOOD CELLS URINE: >998 /HPF

## 2024-07-16 LAB — URINE CYTOLOGY: NORMAL

## 2024-07-17 ENCOUNTER — APPOINTMENT (OUTPATIENT)
Dept: PSYCHIATRY | Facility: HOSPITAL | Age: 59
End: 2024-07-17

## 2024-07-23 ENCOUNTER — APPOINTMENT (OUTPATIENT)
Dept: INFECTIOUS DISEASE | Facility: CLINIC | Age: 59
End: 2024-07-23

## 2024-07-23 ENCOUNTER — APPOINTMENT (OUTPATIENT)
Dept: UROLOGY | Facility: CLINIC | Age: 59
End: 2024-07-23
Payer: MEDICARE

## 2024-07-23 PROBLEM — N40.1 BPH WITH URINARY OBSTRUCTION: Status: ACTIVE | Noted: 2024-07-10

## 2024-07-23 LAB
BILIRUB UR QL STRIP: NEGATIVE
GLUCOSE UR-MCNC: 500
HCG UR QL: 0.2 EU/DL
HGB UR QL STRIP.AUTO: ABNORMAL
KETONES UR-MCNC: NEGATIVE
LEUKOCYTE ESTERASE UR QL STRIP: ABNORMAL
NITRITE UR QL STRIP: NEGATIVE
PH UR STRIP: 6
PROT UR STRIP-MCNC: 100
SP GR UR STRIP: 1.02

## 2024-07-23 PROCEDURE — 99213 OFFICE O/P EST LOW 20 MIN: CPT

## 2024-07-23 PROCEDURE — 81003 URINALYSIS AUTO W/O SCOPE: CPT | Mod: QW

## 2024-07-23 PROCEDURE — 51798 US URINE CAPACITY MEASURE: CPT

## 2024-07-23 RX ORDER — DOXYCYCLINE HYCLATE 100 MG/1
100 TABLET ORAL
Qty: 20 | Refills: 0 | Status: ACTIVE | COMMUNITY
Start: 2024-07-23 | End: 1900-01-01

## 2024-07-23 NOTE — ADDENDUM
[FreeTextEntry1] : I, Sharee May, acted as a scribe on behalf of Dr. Penny 07/23/2024.   All medical record entries made by Sharee May were at my, Dr.Kip Penny, direction and personally dictated by me on  07/23/2024. I have reviewed the chart and agree that the record accurately reflects my personal performance of the history, physical exam, assessment, and plan. I have also personally directed, reviewed, and agreed with the chart.

## 2024-07-23 NOTE — ASSESSMENT
[FreeTextEntry1] : Imp:Urinary Frequency, likely secondary to diabetes. A transnrectal US of the prostate gland will be out coming.

## 2024-07-23 NOTE — HISTORY OF PRESENT ILLNESS
[FreeTextEntry1] : HEMAL YOUNG is a 58 year male patient presents today with a CC of increased urinary frequency, occurring approximately every 15 minutes. He is accompanied by a friend. Pt. has a known history of diabetes and is currently on Farxiga. The increased urinary frequency is likely secondary to his diabetes, which is causing severe urinary urgency. A urine analysis conducted today showed only mild nitrites, and his post-void residual was less than 40 cc.

## 2024-07-23 NOTE — END OF VISIT
[FreeTextEntry3] : Recommendations: Use of a condom catheter connected to a leg bag to manage urinary frequency. Prescribe a course of Bactrim DS. Urine sent for culture analysis and cytology.  I, Sharee May, acted as a scribe on behalf of Dr. Penny 07/23/2024.

## 2024-07-24 ENCOUNTER — NON-APPOINTMENT (OUTPATIENT)
Age: 59
End: 2024-07-24

## 2024-07-24 ENCOUNTER — APPOINTMENT (OUTPATIENT)
Dept: CARDIOLOGY | Facility: CLINIC | Age: 59
End: 2024-07-24

## 2024-07-24 DIAGNOSIS — N40.1 BENIGN PROSTATIC HYPERPLASIA WITH LOWER URINARY TRACT SYMPMS: ICD-10-CM

## 2024-07-24 DIAGNOSIS — N13.8 BENIGN PROSTATIC HYPERPLASIA WITH LOWER URINARY TRACT SYMPMS: ICD-10-CM

## 2024-07-24 LAB
APPEARANCE: ABNORMAL
BACTERIA: ABNORMAL /HPF
BILIRUBIN URINE: NEGATIVE
BLOOD URINE: ABNORMAL
CAST: 1 /LPF
COLOR: YELLOW
EPITHELIAL CELLS: 3 /HPF
GLUCOSE QUALITATIVE U: >=1000 MG/DL
HYALINE CASTS: PRESENT
KETONES URINE: NEGATIVE MG/DL
LEUKOCYTE ESTERASE URINE: ABNORMAL
MICROSCOPIC-UA: NORMAL
NITRITE URINE: NEGATIVE
PH URINE: 6
PROTEIN URINE: 100 MG/DL
RED BLOOD CELLS URINE: 169 /HPF
REVIEW: NORMAL
SPECIFIC GRAVITY URINE: 1.02
UROBILINOGEN URINE: 0.2 MG/DL
WHITE BLOOD CELLS URINE: >998 /HPF
YEAST-LIKE CELLS: PRESENT

## 2024-07-24 RX ORDER — CEFUROXIME AXETIL 500 MG/1
500 TABLET ORAL
Qty: 20 | Refills: 0 | Status: ACTIVE | COMMUNITY
Start: 2024-07-24 | End: 1900-01-01

## 2024-07-25 LAB — BACTERIA UR CULT: NORMAL

## 2024-07-26 LAB — URINE CYTOLOGY: NORMAL

## 2024-08-01 ENCOUNTER — APPOINTMENT (OUTPATIENT)
Dept: UROLOGY | Facility: CLINIC | Age: 59
End: 2024-08-01
Payer: MEDICARE

## 2024-08-01 DIAGNOSIS — R31.0 GROSS HEMATURIA: ICD-10-CM

## 2024-08-01 PROCEDURE — 99213 OFFICE O/P EST LOW 20 MIN: CPT | Mod: 25

## 2024-08-01 PROCEDURE — 51702 INSERT TEMP BLADDER CATH: CPT

## 2024-08-01 RX ORDER — PHENAZOPYRIDINE 200 MG/1
200 TABLET, FILM COATED ORAL
Qty: 60 | Refills: 0 | Status: ACTIVE | COMMUNITY
Start: 2024-08-01 | End: 1900-01-01

## 2024-08-01 RX ORDER — OXYBUTYNIN CHLORIDE 10 MG/1
10 TABLET, EXTENDED RELEASE ORAL
Qty: 30 | Refills: 11 | Status: ACTIVE | COMMUNITY
Start: 2024-08-01 | End: 1900-01-01

## 2024-08-02 ENCOUNTER — APPOINTMENT (OUTPATIENT)
Dept: UROLOGY | Facility: CLINIC | Age: 59
End: 2024-08-02

## 2024-08-02 LAB
APPEARANCE: ABNORMAL
BACTERIA: ABNORMAL /HPF
BILIRUBIN URINE: NEGATIVE
BLOOD URINE: ABNORMAL
CAST: 2 /LPF
COLOR: ABNORMAL
EPITHELIAL CELLS: 2 /HPF
GLUCOSE QUALITATIVE U: 500 MG/DL
HYALINE CASTS: PRESENT
KETONES URINE: NEGATIVE MG/DL
LEUKOCYTE ESTERASE URINE: ABNORMAL
MICROSCOPIC-UA: NORMAL
NITRITE URINE: NEGATIVE
PH URINE: 6
PROTEIN URINE: 100 MG/DL
RED BLOOD CELLS URINE: 1782 /HPF
REVIEW: NORMAL
SPECIFIC GRAVITY URINE: 1.02
UROBILINOGEN URINE: 0.2 MG/DL
WHITE BLOOD CELLS URINE: >998 /HPF

## 2024-08-02 NOTE — ASSESSMENT
[FreeTextEntry1] : Probable infection coming down from the stable and calculus.  A catheter was placed and the urine looks somewhat puslike.  He was

## 2024-08-02 NOTE — HISTORY OF PRESENT ILLNESS
[FreeTextEntry1] : This patient who has a known staghorn calculus and an atrophic kidney presents with increased urinary frequency and urgency.

## 2024-08-02 NOTE — END OF VISIT
[FreeTextEntry3] : Urine culture was sent and I called in Pyridium and oxybutynin for him.   He is on antibiotics and will return for transrectal ultrasound of the prostate gland

## 2024-08-04 LAB — BACTERIA UR CULT: NORMAL

## 2024-08-05 ENCOUNTER — APPOINTMENT (OUTPATIENT)
Dept: UROLOGY | Facility: CLINIC | Age: 59
End: 2024-08-05

## 2024-08-05 LAB — URINE CYTOLOGY: NORMAL

## 2024-08-07 ENCOUNTER — APPOINTMENT (OUTPATIENT)
Dept: CARDIOLOGY | Facility: CLINIC | Age: 59
End: 2024-08-07

## 2024-08-07 ENCOUNTER — APPOINTMENT (OUTPATIENT)
Dept: UROLOGY | Facility: CLINIC | Age: 59
End: 2024-08-07

## 2024-08-14 ENCOUNTER — RX RENEWAL (OUTPATIENT)
Age: 59
End: 2024-08-14

## 2024-08-21 ENCOUNTER — APPOINTMENT (OUTPATIENT)
Dept: PSYCHIATRY | Facility: HOSPITAL | Age: 59
End: 2024-08-21

## 2024-08-21 PROCEDURE — 99214 OFFICE O/P EST MOD 30 MIN: CPT | Mod: 95

## 2024-08-22 ENCOUNTER — APPOINTMENT (OUTPATIENT)
Dept: CARDIOLOGY | Facility: CLINIC | Age: 59
End: 2024-08-22

## 2024-08-22 NOTE — HISTORY OF PRESENT ILLNESS
[FreeTextEntry1] : Pt is a 57 yr old male with PMH HIV (since late 1980's, well-controlled, on HAART), anxiety, and chronic peripheral neuropathy (HIV-related), formerly in care with Dr. Yeung (pain mgmt) followed by Dr. Hawley (palliative). Referred to my practice for continuation of chronic pain treatment/opioid management.  Interval Hx: Chart reviewed beforehand. Pt seen virtually. Pleasant, cooperative, AAO X3 with no overt confusion/sedation. Visibly uncomfortable due to bladder spasms. We reviewed recent medical history including likely need for nephrectomy for large renal stone. Used pain meds (Oxycodone IR) a bit quickly to address acute bladder spasm pain, but also took less on other days to make his month last. He denied using methadone or lyrica quickly. Says mood/anxiety is stable despite pain. Frustrated by medical system.

## 2024-08-22 NOTE — PHYSICAL EXAM
[FreeTextEntry5] : deferred, virtual format [Average] : average [Cooperative] : cooperative [Euthymic] : euthymic [Full] : full [Clear] : clear [Linear/Goal Directed] : linear/goal directed [WNL] : within normal limits

## 2024-08-22 NOTE — DISCUSSION/SUMMARY
[FreeTextEntry1] : Pt is a 57 yr old male with PMH HIV (since late 1980's, well-controlled, on HAART), anxiety, and chronic peripheral neuropathy (HIV-related), formerly in care with Dr. Yeung (pain mgmt) followed by Dr. Hawley (palliative). Referred to my practice for continuation of chronic pain treatment/opioid management. The patient suffers from chronic, debilitating neuropathy and has been partially-controlled with chronic opioid therapy without evidence of severe side effects or addiction/misuse. As such, the benefits from continuing chronic opioid therapy currently outweigh the risks with appropriate monitoring.  Plan: - C/W Methadone 10mg 3 tabs in AM, 3 tabs in PM, and 4 tabs in HS (total 10/day); Rx refilled - C/W Oxycodone 30mg 1-2 tabs 3-5/day (total 8/day, N= 240, previously at 270); Rx refilled - C/W Lyrica 150mg BID; Rx refilled. - Will renew medications q 30 days - Will explore augmenting care with antidepressant with neuropathy potential (will collaborate with psychiatrist Dr. Gonzalez) - Will have virtual f/u with patient PRN.

## 2024-08-22 NOTE — REVIEW OF SYSTEMS
[Negative] : Allergic/Immunologic [FreeTextEntry8] : bladder spasm pains as above [de-identified] : chronic neuropathic pain in LE

## 2024-08-22 NOTE — REASON FOR VISIT
[Access issues (e.g., transportation, impaired mobility, etc.)] : due to patient's access issues [Telehealth (audio & video) - Individual/Group] : This visit was provided via telehealth using real-time 2-way audio visual technology. [Medical Office: ([facility])___] : The provider was located at the medical office in [unfilled]. [Home] : The patient, [unfilled], was located at home, [unfilled], at the time of the visit. [Patient] : Patient [FreeTextEntry1] : chronic pain f/u

## 2024-08-23 ENCOUNTER — APPOINTMENT (OUTPATIENT)
Dept: CARDIOLOGY | Facility: CLINIC | Age: 59
End: 2024-08-23

## 2024-08-28 ENCOUNTER — APPOINTMENT (OUTPATIENT)
Dept: UROLOGY | Facility: CLINIC | Age: 59
End: 2024-08-28

## 2024-08-29 ENCOUNTER — APPOINTMENT (OUTPATIENT)
Dept: UROLOGY | Facility: CLINIC | Age: 59
End: 2024-08-29
Payer: MEDICARE

## 2024-08-29 DIAGNOSIS — R33.9 RETENTION OF URINE, UNSPECIFIED: ICD-10-CM

## 2024-08-29 DIAGNOSIS — R82.993 HYPERURICOSURIA: ICD-10-CM

## 2024-08-29 DIAGNOSIS — N13.2 HYDRONEPHROSIS WITH RENAL AND URETERAL CALCULOUS OBSTRUCTION: ICD-10-CM

## 2024-08-29 DIAGNOSIS — N20.0 CALCULUS OF KIDNEY: ICD-10-CM

## 2024-08-29 DIAGNOSIS — R82.992 HYPEROXALURIA: ICD-10-CM

## 2024-08-29 DIAGNOSIS — N26.1 ATROPHY OF KIDNEY (TERMINAL): ICD-10-CM

## 2024-08-29 PROCEDURE — 99214 OFFICE O/P EST MOD 30 MIN: CPT

## 2024-08-29 RX ORDER — ALLOPURINOL 300 MG/1
300 TABLET ORAL DAILY
Qty: 90 | Refills: 3 | Status: ACTIVE | COMMUNITY
Start: 2024-08-29 | End: 1900-01-01

## 2024-08-29 NOTE — HISTORY OF PRESENT ILLNESS
[FreeTextEntry1] : Pt is 59 yo male with mult medical issues has sig stone left kidney, which is atrophic. H/o DM, diabetic neuropathy, HIV, CKD, diastolic heart failure, chronic pain syndrome. Recently saw Dr. Bodi- atkinson placed, sig purulence. Was to f/u for left nephrectomy, still needs to be done- feels like he's been 'passed around without ever getting it done'.  24 hour urine done 1/2024, but only f/u now for it: vol excellent, but severe hyperuricosuria (985, 1112), and hyperoxaluria (65/75). Sodium > 300 both days, though calcium low. [Urinary Retention] : urinary retention

## 2024-08-29 NOTE — PHYSICAL EXAM
[Normal Appearance] : normal appearance [Well Groomed] : well groomed [General Appearance - In No Acute Distress] : no acute distress [Edema] : no peripheral edema [Respiration, Rhythm And Depth] : normal respiratory rhythm and effort [Exaggerated Use Of Accessory Muscles For Inspiration] : no accessory muscle use [] : no rash [No Focal Deficits] : no focal deficits [Oriented To Time, Place, And Person] : oriented to person, place, and time [Affect] : the affect was normal [Mood] : the mood was normal [de-identified] : deferred [de-identified] : atkinson in place, draining blood tinged [de-identified] : cart assist

## 2024-08-29 NOTE — ASSESSMENT
[FreeTextEntry1] : metabolic issues, dietary issues on 24 hour urine reviewed.  Diet modification reviewed at length- increasing fluids (primarily water), citrus is good, and decreasing/moderating salt, animal flesh protein, oxalate containing foods, and moderation of calcium intake (1000 mg/day is USRDA).  I reviewed with the patient the risks of metabolic stone disease given their underlying risk parameters (all of which include large stones, multiple stones, bilateral stones, family history, and young age), and the indications for 24 hour urine metabolic assessment.  DASH diet a good overall diet plan to consider and review for general health and stone health.  We also discussed benefits of regular exercise and weight loss as independent risk reducers for stones.  1. Diet modification as discussed. Also, start allopurinol 300 mg per day for hyperuricosuria, though animal protein markers are reasonable 2. 24 hour urine collection x 1 in the next few weeks 3. Renal US in coming weeks to reassess 4. plan to refer to Dr. Whittaker regarding potential lap nephrectomy.

## 2024-09-04 ENCOUNTER — APPOINTMENT (OUTPATIENT)
Dept: INFECTIOUS DISEASE | Facility: CLINIC | Age: 59
End: 2024-09-04
Payer: MEDICARE

## 2024-09-04 ENCOUNTER — NON-APPOINTMENT (OUTPATIENT)
Age: 59
End: 2024-09-04

## 2024-09-04 DIAGNOSIS — Z01.20 ENCOUNTER FOR DENTAL EXAMINATION AND CLEANING W/OUT ABNORMAL FINDINGS: ICD-10-CM

## 2024-09-04 DIAGNOSIS — Z01.00 ENCOUNTER FOR EXAMINATION OF EYES AND VISION W/OUT ABNORMAL FINDINGS: ICD-10-CM

## 2024-09-04 DIAGNOSIS — B20 HUMAN IMMUNODEFICIENCY VIRUS [HIV] DISEASE: ICD-10-CM

## 2024-09-04 DIAGNOSIS — R39.9 UNSPECIFIED SYMPTOMS AND SIGNS INVOLVING THE GENITOURINARY SYSTEM: ICD-10-CM

## 2024-09-04 PROCEDURE — 99447 NTRPROF PH1/NTRNET/EHR 11-20: CPT

## 2024-09-04 RX ORDER — SEMAGLUTIDE 2.68 MG/ML
8 INJECTION, SOLUTION SUBCUTANEOUS
Refills: 0 | Status: ACTIVE | COMMUNITY
Start: 2024-09-04

## 2024-09-10 ENCOUNTER — NON-APPOINTMENT (OUTPATIENT)
Age: 59
End: 2024-09-10

## 2024-09-17 ENCOUNTER — NON-APPOINTMENT (OUTPATIENT)
Age: 59
End: 2024-09-17

## 2024-09-19 ENCOUNTER — APPOINTMENT (OUTPATIENT)
Dept: ULTRASOUND IMAGING | Facility: CLINIC | Age: 59
End: 2024-09-19

## 2024-09-24 ENCOUNTER — APPOINTMENT (OUTPATIENT)
Dept: UROLOGY | Facility: CLINIC | Age: 59
End: 2024-09-24
Payer: MEDICARE

## 2024-09-24 DIAGNOSIS — N26.1 ATROPHY OF KIDNEY (TERMINAL): ICD-10-CM

## 2024-09-24 DIAGNOSIS — N31.9 NEUROMUSCULAR DYSFUNCTION OF BLADDER, UNSPECIFIED: ICD-10-CM

## 2024-09-24 DIAGNOSIS — N20.0 CALCULUS OF KIDNEY: ICD-10-CM

## 2024-09-24 DIAGNOSIS — R33.9 RETENTION OF URINE, UNSPECIFIED: ICD-10-CM

## 2024-09-24 DIAGNOSIS — R82.993 HYPERURICOSURIA: ICD-10-CM

## 2024-09-24 PROCEDURE — 99441: CPT

## 2024-09-24 NOTE — ASSESSMENT
[FreeTextEntry1] : Due to see Dr. Whittaker about left nx.  1. Diet modification as discussed. Also, start allopurinol 300 mg per day for hyperuricosuria 2. 24 hour urine collection x 1 in the next few weeks when able. we can review as TTM 2-3 weeks after 24 hour urine submitted 3. Renal US in coming weeks to reassess and ensure no sig changes to right kidney 4. plan to refer to Dr. Whittaker regarding potential lap nephrectomy.

## 2024-09-24 NOTE — HISTORY OF PRESENT ILLNESS
[Other Location: e.g. School (Enter Location, City,State)___] : at [unfilled], at the time of the visit. [Other Location: e.g. Home (Enter Location, City,State)___] : at [unfilled] [Verbal consent obtained from patient] : the patient, [unfilled] [FreeTextEntry1] : The patient-doctor relationship has been established in audio HIPAA compliant communication. The patient's identity has been confirmed. The patient was previously emailed a copy of the telemedicine consent. They have had a chance to review and has now given verbal consent and has requested care to be assessed and treated via telemedicine. They understand there may be limitations in this process, and that they may need further followup care in the office and/or hospital settings.  Verbal consent given on Sep 24 2024 12:40PM  HEMAL YOUNG is a 58 year M who presents for sig stone left kidney, which is atrophic. H/o DM, diabetic neuropathy, HIV, CKD, diastolic heart failure, chronic pain syndrome.  Recently saw Dr. Bodi- atkinson placed, sig purulence. Was to f/u for left nephrectomy, still needs to be done- feels like he's been 'passed around without ever getting it done'.  24 hour urine done 1/2024, but only f/u now for it: vol excellent, but severe hyperuricosuria (985, 1112), and hyperoxaluria (65/75). Sodium > 300 both days, though calcium low.  Was to have started allopurinol 300 mg, hasn't gotten script.  F/u today to review- has also not yet done 24 hour urine collection.  09/24/2024   The patient denies fevers, chills, nausea and/or vomiting, and no unexplained weight loss.  All pertinent parts of the patient PFSH (past medical, family, and social histories), laboratory, radiological studies and available physician notes were reviewed prior to starting the face-to-face portion of the telemedicine visit. Questionnaire results, where appropriate, were discussed with the patient.

## 2024-09-30 ENCOUNTER — NON-APPOINTMENT (OUTPATIENT)
Age: 59
End: 2024-09-30

## 2024-10-01 ENCOUNTER — APPOINTMENT (OUTPATIENT)
Dept: INFECTIOUS DISEASE | Facility: CLINIC | Age: 59
End: 2024-10-01

## 2024-10-01 PROCEDURE — 99213 OFFICE O/P EST LOW 20 MIN: CPT | Mod: 95

## 2024-10-08 ENCOUNTER — NON-APPOINTMENT (OUTPATIENT)
Age: 59
End: 2024-10-08

## 2024-10-14 ENCOUNTER — APPOINTMENT (OUTPATIENT)
Dept: UROLOGY | Facility: CLINIC | Age: 59
End: 2024-10-14

## 2024-10-16 ENCOUNTER — NON-APPOINTMENT (OUTPATIENT)
Age: 59
End: 2024-10-16

## 2024-10-23 ENCOUNTER — NON-APPOINTMENT (OUTPATIENT)
Age: 59
End: 2024-10-23

## 2024-10-23 ENCOUNTER — RX RENEWAL (OUTPATIENT)
Age: 59
End: 2024-10-23

## 2024-10-30 ENCOUNTER — LABORATORY RESULT (OUTPATIENT)
Age: 59
End: 2024-10-30

## 2024-10-31 ENCOUNTER — LABORATORY RESULT (OUTPATIENT)
Age: 59
End: 2024-10-31

## 2024-11-06 ENCOUNTER — APPOINTMENT (OUTPATIENT)
Dept: NEPHROLOGY | Facility: CLINIC | Age: 59
End: 2024-11-06

## 2024-11-19 ENCOUNTER — APPOINTMENT (OUTPATIENT)
Dept: INFECTIOUS DISEASE | Facility: CLINIC | Age: 59
End: 2024-11-19
Payer: MEDICARE

## 2024-11-19 PROCEDURE — 99213 OFFICE O/P EST LOW 20 MIN: CPT

## 2024-11-20 ENCOUNTER — NON-APPOINTMENT (OUTPATIENT)
Age: 59
End: 2024-11-20

## 2024-11-20 DIAGNOSIS — N39.0 URINARY TRACT INFECTION, SITE NOT SPECIFIED: ICD-10-CM

## 2024-11-20 DIAGNOSIS — N30.00 ACUTE CYSTITIS W/OUT HEMATURIA: ICD-10-CM

## 2024-11-20 DIAGNOSIS — A49.9 URINARY TRACT INFECTION, SITE NOT SPECIFIED: ICD-10-CM

## 2024-11-20 RX ORDER — TETRACYCLINE HYDROCHLORIDE 500 MG/1
500 CAPSULE ORAL
Qty: 14 | Refills: 0 | Status: DISCONTINUED | COMMUNITY
Start: 2024-11-20 | End: 2024-11-20

## 2024-11-20 RX ORDER — AMOXICILLIN AND CLAVULANATE POTASSIUM 875; 125 MG/1; MG/1
875-125 TABLET, COATED ORAL
Qty: 1 | Refills: 0 | Status: ACTIVE | COMMUNITY
Start: 2024-11-20 | End: 1900-01-01

## 2024-12-03 ENCOUNTER — LABORATORY RESULT (OUTPATIENT)
Age: 59
End: 2024-12-03

## 2024-12-03 ENCOUNTER — APPOINTMENT (OUTPATIENT)
Dept: INFECTIOUS DISEASE | Facility: CLINIC | Age: 59
End: 2024-12-03
Payer: MEDICARE

## 2024-12-03 ENCOUNTER — APPOINTMENT (OUTPATIENT)
Dept: UROLOGY | Facility: CLINIC | Age: 59
End: 2024-12-03
Payer: MEDICARE

## 2024-12-03 ENCOUNTER — NON-APPOINTMENT (OUTPATIENT)
Age: 59
End: 2024-12-03

## 2024-12-03 VITALS
HEIGHT: 73 IN | DIASTOLIC BLOOD PRESSURE: 83 MMHG | HEART RATE: 99 BPM | WEIGHT: 293 LBS | TEMPERATURE: 97.8 F | BODY MASS INDEX: 38.83 KG/M2 | OXYGEN SATURATION: 90 % | SYSTOLIC BLOOD PRESSURE: 137 MMHG

## 2024-12-03 VITALS — SYSTOLIC BLOOD PRESSURE: 105 MMHG | DIASTOLIC BLOOD PRESSURE: 73 MMHG

## 2024-12-03 DIAGNOSIS — N26.1 ATROPHY OF KIDNEY (TERMINAL): ICD-10-CM

## 2024-12-03 DIAGNOSIS — N18.30 CHRONIC KIDNEY DISEASE, STAGE 3 UNSPECIFIED: ICD-10-CM

## 2024-12-03 DIAGNOSIS — A49.9 URINARY TRACT INFECTION, SITE NOT SPECIFIED: ICD-10-CM

## 2024-12-03 DIAGNOSIS — N39.0 URINARY TRACT INFECTION, SITE NOT SPECIFIED: ICD-10-CM

## 2024-12-03 DIAGNOSIS — N13.8 BENIGN PROSTATIC HYPERPLASIA WITH LOWER URINARY TRACT SYMPMS: ICD-10-CM

## 2024-12-03 DIAGNOSIS — B20 HUMAN IMMUNODEFICIENCY VIRUS [HIV] DISEASE: ICD-10-CM

## 2024-12-03 DIAGNOSIS — N40.1 BENIGN PROSTATIC HYPERPLASIA WITH LOWER URINARY TRACT SYMPMS: ICD-10-CM

## 2024-12-03 PROCEDURE — 99214 OFFICE O/P EST MOD 30 MIN: CPT | Mod: 57

## 2024-12-03 PROCEDURE — 99214 OFFICE O/P EST MOD 30 MIN: CPT | Mod: GC

## 2024-12-04 ENCOUNTER — NON-APPOINTMENT (OUTPATIENT)
Age: 59
End: 2024-12-04

## 2024-12-05 ENCOUNTER — APPOINTMENT (OUTPATIENT)
Dept: INFECTIOUS DISEASE | Facility: CLINIC | Age: 59
End: 2024-12-05

## 2024-12-05 PROCEDURE — 99215 OFFICE O/P EST HI 40 MIN: CPT

## 2024-12-05 RX ORDER — CLONAZEPAM 1 MG/1
1 TABLET ORAL 3 TIMES DAILY
Qty: 45 | Refills: 0 | Status: ACTIVE | COMMUNITY
Start: 2024-12-05 | End: 1900-01-01

## 2024-12-09 RX ORDER — CEPHALEXIN 500 MG/1
500 TABLET ORAL 3 TIMES DAILY
Qty: 30 | Refills: 0 | Status: ACTIVE | COMMUNITY
Start: 2024-12-09 | End: 1900-01-01

## 2024-12-10 ENCOUNTER — NON-APPOINTMENT (OUTPATIENT)
Age: 59
End: 2024-12-10

## 2024-12-13 ENCOUNTER — NON-APPOINTMENT (OUTPATIENT)
Age: 59
End: 2024-12-13

## 2024-12-16 ENCOUNTER — NON-APPOINTMENT (OUTPATIENT)
Age: 59
End: 2024-12-16

## 2024-12-18 ENCOUNTER — NON-APPOINTMENT (OUTPATIENT)
Age: 59
End: 2024-12-18

## 2024-12-19 ENCOUNTER — APPOINTMENT (OUTPATIENT)
Dept: INFECTIOUS DISEASE | Facility: CLINIC | Age: 59
End: 2024-12-19

## 2024-12-19 DIAGNOSIS — G47.00 INSOMNIA, UNSPECIFIED: ICD-10-CM

## 2024-12-19 DIAGNOSIS — F51.01 PRIMARY INSOMNIA: ICD-10-CM

## 2024-12-19 PROCEDURE — 99213 OFFICE O/P EST LOW 20 MIN: CPT

## 2024-12-26 ENCOUNTER — RX RENEWAL (OUTPATIENT)
Age: 59
End: 2024-12-26

## 2024-12-26 ENCOUNTER — NON-APPOINTMENT (OUTPATIENT)
Age: 59
End: 2024-12-26

## 2024-12-30 ENCOUNTER — NON-APPOINTMENT (OUTPATIENT)
Age: 59
End: 2024-12-30

## 2025-01-06 ENCOUNTER — NON-APPOINTMENT (OUTPATIENT)
Age: 60
End: 2025-01-06

## 2025-01-07 ENCOUNTER — APPOINTMENT (OUTPATIENT)
Dept: UROLOGY | Facility: CLINIC | Age: 60
End: 2025-01-07
Payer: MEDICARE

## 2025-01-07 DIAGNOSIS — N20.0 CALCULUS OF KIDNEY: ICD-10-CM

## 2025-01-07 DIAGNOSIS — N13.2 HYDRONEPHROSIS WITH RENAL AND URETERAL CALCULOUS OBSTRUCTION: ICD-10-CM

## 2025-01-07 DIAGNOSIS — N26.1 ATROPHY OF KIDNEY (TERMINAL): ICD-10-CM

## 2025-01-07 PROCEDURE — 99214 OFFICE O/P EST MOD 30 MIN: CPT

## 2025-01-16 ENCOUNTER — APPOINTMENT (OUTPATIENT)
Dept: INFECTIOUS DISEASE | Facility: CLINIC | Age: 60
End: 2025-01-16
Payer: MEDICARE

## 2025-01-16 PROCEDURE — 99215 OFFICE O/P EST HI 40 MIN: CPT | Mod: 2W

## 2025-01-24 ENCOUNTER — NON-APPOINTMENT (OUTPATIENT)
Age: 60
End: 2025-01-24

## 2025-01-27 ENCOUNTER — NON-APPOINTMENT (OUTPATIENT)
Age: 60
End: 2025-01-27

## 2025-01-29 ENCOUNTER — OUTPATIENT (OUTPATIENT)
Dept: OUTPATIENT SERVICES | Facility: HOSPITAL | Age: 60
LOS: 1 days | End: 2025-01-29
Payer: MEDICARE

## 2025-01-29 VITALS
RESPIRATION RATE: 18 BRPM | SYSTOLIC BLOOD PRESSURE: 100 MMHG | DIASTOLIC BLOOD PRESSURE: 68 MMHG | HEIGHT: 73 IN | HEART RATE: 86 BPM | OXYGEN SATURATION: 93 % | TEMPERATURE: 100 F | WEIGHT: 285.06 LBS

## 2025-01-29 DIAGNOSIS — Z98.890 OTHER SPECIFIED POSTPROCEDURAL STATES: Chronic | ICD-10-CM

## 2025-01-29 DIAGNOSIS — E11.9 TYPE 2 DIABETES MELLITUS WITHOUT COMPLICATIONS: ICD-10-CM

## 2025-01-29 DIAGNOSIS — G47.30 SLEEP APNEA, UNSPECIFIED: ICD-10-CM

## 2025-01-29 DIAGNOSIS — Z90.89 ACQUIRED ABSENCE OF OTHER ORGANS: Chronic | ICD-10-CM

## 2025-01-29 DIAGNOSIS — N26.1 ATROPHY OF KIDNEY (TERMINAL): ICD-10-CM

## 2025-01-29 DIAGNOSIS — N20.0 CALCULUS OF KIDNEY: ICD-10-CM

## 2025-01-29 DIAGNOSIS — Z01.818 ENCOUNTER FOR OTHER PREPROCEDURAL EXAMINATION: ICD-10-CM

## 2025-01-29 LAB
A1C WITH ESTIMATED AVERAGE GLUCOSE RESULT: 11.5 % — HIGH (ref 4–5.6)
ANION GAP SERPL CALC-SCNC: 13 MMOL/L — SIGNIFICANT CHANGE UP (ref 5–17)
BLD GP AB SCN SERPL QL: NEGATIVE — SIGNIFICANT CHANGE UP
BUN SERPL-MCNC: 21 MG/DL — SIGNIFICANT CHANGE UP (ref 7–23)
CALCIUM SERPL-MCNC: 9.8 MG/DL — SIGNIFICANT CHANGE UP (ref 8.4–10.5)
CHLORIDE SERPL-SCNC: 99 MMOL/L — SIGNIFICANT CHANGE UP (ref 96–108)
CO2 SERPL-SCNC: 25 MMOL/L — SIGNIFICANT CHANGE UP (ref 22–31)
CREAT SERPL-MCNC: 1.6 MG/DL — HIGH (ref 0.5–1.3)
EGFR: 49 ML/MIN/1.73M2 — LOW
ESTIMATED AVERAGE GLUCOSE: 283 MG/DL — HIGH (ref 68–114)
GLUCOSE SERPL-MCNC: 357 MG/DL — HIGH (ref 70–99)
HCT VFR BLD CALC: 39.1 % — SIGNIFICANT CHANGE UP (ref 39–50)
HGB BLD-MCNC: 11.9 G/DL — LOW (ref 13–17)
MCHC RBC-ENTMCNC: 24.8 PG — LOW (ref 27–34)
MCHC RBC-ENTMCNC: 30.4 G/DL — LOW (ref 32–36)
MCV RBC AUTO: 81.5 FL — SIGNIFICANT CHANGE UP (ref 80–100)
NRBC # BLD: 0 /100 WBCS — SIGNIFICANT CHANGE UP (ref 0–0)
NRBC BLD-RTO: 0 /100 WBCS — SIGNIFICANT CHANGE UP (ref 0–0)
PLATELET # BLD AUTO: 302 K/UL — SIGNIFICANT CHANGE UP (ref 150–400)
POTASSIUM SERPL-MCNC: 4.6 MMOL/L — SIGNIFICANT CHANGE UP (ref 3.5–5.3)
POTASSIUM SERPL-SCNC: 4.6 MMOL/L — SIGNIFICANT CHANGE UP (ref 3.5–5.3)
RBC # BLD: 4.8 M/UL — SIGNIFICANT CHANGE UP (ref 4.2–5.8)
RBC # FLD: 15.6 % — HIGH (ref 10.3–14.5)
RH IG SCN BLD-IMP: NEGATIVE — SIGNIFICANT CHANGE UP
SODIUM SERPL-SCNC: 137 MMOL/L — SIGNIFICANT CHANGE UP (ref 135–145)
WBC # BLD: 8.54 K/UL — SIGNIFICANT CHANGE UP (ref 3.8–10.5)
WBC # FLD AUTO: 8.54 K/UL — SIGNIFICANT CHANGE UP (ref 3.8–10.5)

## 2025-01-29 PROCEDURE — 85027 COMPLETE CBC AUTOMATED: CPT

## 2025-01-29 PROCEDURE — 87086 URINE CULTURE/COLONY COUNT: CPT

## 2025-01-29 PROCEDURE — 86850 RBC ANTIBODY SCREEN: CPT

## 2025-01-29 PROCEDURE — 86900 BLOOD TYPING SEROLOGIC ABO: CPT

## 2025-01-29 PROCEDURE — 87186 SC STD MICRODIL/AGAR DIL: CPT

## 2025-01-29 PROCEDURE — 83036 HEMOGLOBIN GLYCOSYLATED A1C: CPT

## 2025-01-29 PROCEDURE — 87077 CULTURE AEROBIC IDENTIFY: CPT

## 2025-01-29 PROCEDURE — G0463: CPT

## 2025-01-29 PROCEDURE — 86901 BLOOD TYPING SEROLOGIC RH(D): CPT

## 2025-01-29 PROCEDURE — 80048 BASIC METABOLIC PNL TOTAL CA: CPT

## 2025-01-29 RX ORDER — TAMSULOSIN HYDROCHLORIDE 0.4 MG/1
2 CAPSULE ORAL
Refills: 0 | DISCHARGE

## 2025-01-29 RX ORDER — ESOMEPRAZOLE MAGNESIUM 20 MG/1
1 CAPSULE, DELAYED RELEASE ORAL
Refills: 0 | DISCHARGE

## 2025-01-29 RX ORDER — ALLOPURINOL 300 MG
1 TABLET ORAL
Refills: 0 | DISCHARGE

## 2025-01-29 RX ORDER — TRAZODONE HCL 100 MG
1 TABLET ORAL
Refills: 0 | DISCHARGE

## 2025-01-29 RX ORDER — LINACLOTIDE 290 UG/1
1 CAPSULE, GELATIN COATED ORAL
Refills: 0 | DISCHARGE

## 2025-01-29 RX ORDER — ALPRAZOLAM 2 MG
1 TABLET ORAL
Refills: 0 | DISCHARGE

## 2025-01-29 RX ORDER — SILODOSIN 8 MG/1
1 CAPSULE ORAL
Refills: 0 | DISCHARGE

## 2025-01-29 RX ORDER — METHADONE HYDROCHLORIDE 5 MG/5ML
3 SOLUTION ORAL
Refills: 0 | DISCHARGE

## 2025-01-29 RX ORDER — DOLUTEGRAVIR SODIUM AND RILPIVIRINE HYDROCHLORIDE 50; 25 MG/1; MG/1
1 TABLET, FILM COATED ORAL
Refills: 0 | DISCHARGE

## 2025-01-29 RX ORDER — SENNOSIDES 8.6 MG
2 TABLET ORAL
Refills: 0 | DISCHARGE

## 2025-01-29 RX ORDER — OXYCODONE HYDROCHLORIDE 30 MG/1
1 TABLET ORAL
Refills: 0 | DISCHARGE

## 2025-01-29 RX ORDER — MAGNESIUM OXIDE 400 MG
400 TABLET ORAL
Refills: 0 | DISCHARGE

## 2025-01-29 RX ORDER — SEMAGLUTIDE 0.25 MG/.5ML
0.5 INJECTION, SOLUTION SUBCUTANEOUS
Refills: 0 | DISCHARGE

## 2025-01-29 RX ORDER — METHADONE HYDROCHLORIDE 5 MG/5ML
1 SOLUTION ORAL
Refills: 0 | DISCHARGE

## 2025-01-29 RX ORDER — CLONAZEPAM 2 MG
1 TABLET ORAL
Refills: 0 | DISCHARGE

## 2025-01-29 RX ORDER — LINAGLIPTIN 5 MG/1
1 TABLET, FILM COATED ORAL
Refills: 0 | DISCHARGE

## 2025-01-29 RX ORDER — PREGABALIN CAPSULES, CV 225 MG/1
1 CAPSULE ORAL
Refills: 0 | DISCHARGE

## 2025-01-29 NOTE — H&P PST ADULT - NSICDXPASTMEDICALHX_GEN_ALL_CORE_FT
PAST MEDICAL HISTORY:  DM (diabetes mellitus)     HIV (human immunodeficiency virus infection)     Neuropathy     PNA (pneumonia)     Sleep apnea

## 2025-01-29 NOTE — H&P PST ADULT - ASSESSMENT
DASI score:  DASI activity:  Loose teeth or denture:   DASI score:4.28  DASI activity: requires assist with ADL's. Can walk short distances with walker, Dyspnea with exertion  Loose teeth or denture: N/A edentulous

## 2025-01-29 NOTE — H&P PST ADULT - GENITOURINARY COMMENTS
+Atkinson catheter, changed weekly; last atkinson change by Home RN 2-3 weeks ago; +bladder spasms, frequent UTI Peck cath in place, with cloudy, pink tinged urine

## 2025-01-29 NOTE — H&P PST ADULT - PROBLEM SELECTOR PLAN 1
Fingerstick DOS On Ozempic instructed to stop 1 week prior to surgery  Last dose on 2/8/25  Hold Trajenta on DOS last dose on 2/19/25  Fingerstick DOS

## 2025-01-29 NOTE — H&P PST ADULT - TEMPERATURE IN FAHRENHEIT (DEGREES F)
Nutrition Assessment   Reason for Consult/Assessment: Reassessment      Diagnosis and Hx: Reviewed    Pertinent Nutrition History: Patient indicated has had difficulty eating since beginning of December. Had purchased some Atkins nutritional shakes because she knew it would be some sort of nutrition.    Pertinent Nutrition Information: Labs and meds reviewed.  Patient stated couple of days (1/4-1/7) while on a regular diet she tried eating what she could but it would come right back up.  Is currently NPO for a scope this afternoon.  Is willing to try Ensures once she's allowed to eat again.                                 Diet Order: NPO                  Diet tolerance: NPO   Food Allergies: None known    Demographic/Anthropometrics Information  Gender: female  Patient Age: 59 year old  Height:   Ht Readings from Last 1 Encounters:   01/10/24 5' 2\" (1.575 m)      Weight:   Wt Readings from Last 1 Encounters:   01/10/24 84.1 kg      BMI:   BMI Readings from Last 1 Encounters:   01/10/24 33.93 kg/m²          % Weight Change: -12% x 1m  Weight change significant: Yes  Reason for weight change: Decreased intake     Estimated Needs:  Calculated Energy Needs: 5245-7452  kcal               Calculated protein needs:   g       Calculated fluid needs: 2080 ml            NFPE  Nutrition Focused Physical Exam  Physical Exam Completed: No  Reason Not Completed: Remote coverage                      TREATMENT PLAN: Monitoring & Interventions   Intervention: Coordination of nutrition care by a nutrition professional, Nutrition supplement therapy   Coordination of nutrition care: Await POC after workup -- consider TPN if remains unable to tolerate PO diet     Meals & snacks: NPO                                                           Nutrition supplement therapy: Trial Strawberry or Vanilla nutritional supplement once diet advances per patient preference (flavors)       Goal: Transition to oral intake   Intervention goal  status: Initiated  Time frame to achieve goal: 1-3 days     Dietitian will monitor: Nutrition-focused physical findings, Diet advancement            Nutrition Diagnosis / PES  Nutrition Diagnosis: Unintended weight loss  Related to: Abdominal pain, Vomiting   As evidenced by: Documented/reported poor oral intake, Weight loss over time      Primary Nutrition Diagnosis status: Active nutrition diagnosis                    99.7

## 2025-01-29 NOTE — H&P PST ADULT - OTHER CARE PROVIDERS
Cards:  [phone] (last visit 2024), ID:  [phone] (last visit 1/2025), Pain management:  [phone] (1/2025); Neuro: Dr. Cole Stroud [phone]  (last visit 2024);  Psych"  [phone]  (last visit 12/2024) Cards:  [phone] (last visit 2024), ID:  [phone] (last visit 1/2025), Pain management:  [phone] (1/2025); Nephrologist: Suk-Hyeon Yun, MD last visit [phone]; Neuro: Dr. Cole Stroud [phone]  (last visit 2024);  Psych"  [phone]  (last visit 12/2024)

## 2025-01-29 NOTE — H&P PST ADULT - HISTORY OF PRESENT ILLNESS
59 year old  male with History of: H/o DM, diabetic neuropathy, HIV, CKD, diastolic heart failure, chronic pain syndrome, Has  for sig stone left kidney, which is atrophic Presenting to PST prior to scheduled Left laparoscopic simple nephrectomy . .     Recently saw Dr. Whittaker about left NX - atkinson placed, sig purulence.  Was to f/u for left nephrectomy, ?  ?  The patient denies fevers, chills, nausea and/or vomiting, and no unexplained weight loss. 59 year old  male with History of: H/o DM (on Ozempic), diabetic neuropathy, HIV, CKD, diastolic heart failure, chronic pain syndrome (on methadone),MESSI (wears 2.5L NC at bedtime and as needed) Has  for sig stone left kidney, which is atrophic Presenting to PST prior to scheduled Left laparoscopic simple nephrectomy . .     Patient endorses chronic urinary tract infections, and urinary retention. Has Peck catheter in place (inserted in Aug, 2024) . Catheter changed monthly by visiting nurse. Last change 2-3 weeks ago. Urine pink tinge purulent  Denies fevers, chills, nausea and/or vomiting,    On Ozempic for weight loss. Last dose on 2/8/25. Denies ,nausea vomiting. +constipation (baseline) on Linzess pain. Denies any other  GI symptoms  Patient is a 59yo M with history of atrial flutter s/p ablation in 2018 with Dr Vee, DM, HTN, HLD, HIV with neuropathy, renal calculi, chronic HFpEF, CKD from diabetic nephropathy, MESSI unable to tolerated CPAP, bipolar, morbid obesity, hearing loss here for cardiac evaluation.        59 year old  male with History of: H/o DM (on Ozempic), diabetic neuropathy, HIV, CKD, diastolic heart failure, chronic pain syndrome (on methadone),MESSI (wears 2.5L NC at bedtime and as needed) Has  for sig stone left kidney, which is atrophic Presenting to PST prior to scheduled Left laparoscopic simple nephrectomy . .     Patient endorses chronic urinary tract infections, and urinary retention. Has Peck catheter in place (inserted in Aug, 2024) . Catheter changed monthly by visiting nurse. Last change 2-3 weeks ago. Urine pink tinge purulent  Denies fevers, chills, nausea and/or vomiting,    On Ozempic for weight loss. Last dose on 2/8/25. Denies ,nausea vomiting. + constipation (baseline) on Linzess pain. Denies any other  GI symptoms. 59 year old  male with PMH:  DM (on Ozempic),Bipolar disorder  diabetic neuropathy, HIV, CKD (~Creat 1.7-1.8), diastolic heart failure, chronic pain syndrome (on methadone),MESSI (unable tolerate  cpap- wears 2.5L NC at bedtime and as needed), renal calculi, A-fib (Hx Ablation in 2018 with Dr. Vee), Left atrophic Kidney Presenting to PST prior to scheduled Left laparoscopic simple nephrectomy . .     Of note, patient endorsees, he may have a "palpitations" a few days ago, which have now resolved.  Last visit with  Cards. Dr. Dominguez was approx 7 month ago. Patient was instructed to schedule an cardiac eval with Dr. Dominguez prior to surgery. Also Gave information for Dr. Hussein.  Denies any chestpain or palpitations, dizziness or syncope.     Patient endorses chronic urinary tract infections, and urinary retention. Has Peck catheter in place (inserted in Aug, 2024) . Catheter changed monthly by visiting nurse. Last change 2-3 weeks ago. Urine pink tinge purulent  Denies fevers, chills, nausea and/or vomiting,    On Ozempic for weight loss. Last dose on 2/8/25. Denies ,nausea vomiting. + constipation (baseline) on Linzess pain. Denies any other  GI symptoms.

## 2025-01-29 NOTE — H&P PST ADULT - NSSUBSTANCEUSE_GEN_ALL_CORE_SD
former Cigarette smoker- Smoked x 35 years, Quit  in 2018. smoked up to 3 ppd. Currently vapes daily./caffeine

## 2025-01-29 NOTE — H&P PST ADULT - NSICDXPASTSURGICALHX_GEN_ALL_CORE_FT
PAST SURGICAL HISTORY:  No significant past surgical history      PAST SURGICAL HISTORY:  History of adenoidectomy      PAST SURGICAL HISTORY:  History of adenoidectomy     History of cardiac radiofrequency ablation

## 2025-02-01 LAB
-  GENTAMICIN: SIGNIFICANT CHANGE UP
-  NITROFURANTOIN: SIGNIFICANT CHANGE UP
-  OXACILLIN: SIGNIFICANT CHANGE UP
-  PENICILLIN: SIGNIFICANT CHANGE UP
-  RIFAMPIN: SIGNIFICANT CHANGE UP
-  TETRACYCLINE: SIGNIFICANT CHANGE UP
-  TRIMETHOPRIM/SULFAMETHOXAZOLE: SIGNIFICANT CHANGE UP
-  VANCOMYCIN: SIGNIFICANT CHANGE UP
CULTURE RESULTS: ABNORMAL
METHOD TYPE: SIGNIFICANT CHANGE UP
ORGANISM # SPEC MICROSCOPIC CNT: ABNORMAL
ORGANISM # SPEC MICROSCOPIC CNT: ABNORMAL
SPECIMEN SOURCE: SIGNIFICANT CHANGE UP

## 2025-02-12 ENCOUNTER — APPOINTMENT (OUTPATIENT)
Dept: PSYCHIATRY | Facility: HOSPITAL | Age: 60
End: 2025-02-12

## 2025-02-13 ENCOUNTER — APPOINTMENT (OUTPATIENT)
Dept: INFECTIOUS DISEASE | Facility: CLINIC | Age: 60
End: 2025-02-13

## 2025-02-13 ENCOUNTER — APPOINTMENT (OUTPATIENT)
Dept: CARDIOLOGY | Facility: CLINIC | Age: 60
End: 2025-02-13
Payer: MEDICARE

## 2025-02-13 ENCOUNTER — NON-APPOINTMENT (OUTPATIENT)
Age: 60
End: 2025-02-13

## 2025-02-13 VITALS
DIASTOLIC BLOOD PRESSURE: 70 MMHG | BODY MASS INDEX: 38.43 KG/M2 | HEIGHT: 73 IN | WEIGHT: 290 LBS | SYSTOLIC BLOOD PRESSURE: 118 MMHG | HEART RATE: 93 BPM

## 2025-02-13 DIAGNOSIS — Z01.810 ENCOUNTER FOR PREPROCEDURAL CARDIOVASCULAR EXAMINATION: ICD-10-CM

## 2025-02-13 DIAGNOSIS — I10 ESSENTIAL (PRIMARY) HYPERTENSION: ICD-10-CM

## 2025-02-13 DIAGNOSIS — E11.9 TYPE 2 DIABETES MELLITUS W/OUT COMPLICATIONS: ICD-10-CM

## 2025-02-13 DIAGNOSIS — R06.02 SHORTNESS OF BREATH: ICD-10-CM

## 2025-02-13 DIAGNOSIS — I50.32 CHRONIC DIASTOLIC (CONGESTIVE) HEART FAILURE: ICD-10-CM

## 2025-02-13 DIAGNOSIS — E78.5 HYPERLIPIDEMIA, UNSPECIFIED: ICD-10-CM

## 2025-02-13 DIAGNOSIS — N18.30 CHRONIC KIDNEY DISEASE, STAGE 3 UNSPECIFIED: ICD-10-CM

## 2025-02-13 PROCEDURE — 93000 ELECTROCARDIOGRAM COMPLETE: CPT | Mod: NC

## 2025-02-13 PROCEDURE — 99214 OFFICE O/P EST MOD 30 MIN: CPT | Mod: 2W

## 2025-02-13 PROCEDURE — G2211 COMPLEX E/M VISIT ADD ON: CPT

## 2025-02-13 PROCEDURE — 99215 OFFICE O/P EST HI 40 MIN: CPT

## 2025-02-14 ENCOUNTER — MED ADMIN CHARGE (OUTPATIENT)
Age: 60
End: 2025-02-14

## 2025-02-14 ENCOUNTER — APPOINTMENT (OUTPATIENT)
Dept: CARDIOLOGY | Facility: CLINIC | Age: 60
End: 2025-02-14
Payer: MEDICARE

## 2025-02-14 PROCEDURE — 93015 CV STRESS TEST SUPVJ I&R: CPT

## 2025-02-14 PROCEDURE — 78452 HT MUSCLE IMAGE SPECT MULT: CPT

## 2025-02-14 PROCEDURE — A9500: CPT

## 2025-02-14 RX ORDER — AMINOPHYLLINE 25 MG/ML
25 INJECTION, SOLUTION INTRAVENOUS
Qty: 0 | Refills: 0 | Status: COMPLETED | OUTPATIENT
Start: 2025-02-14

## 2025-02-14 RX ORDER — REGADENOSON 0.08 MG/ML
0.4 INJECTION, SOLUTION INTRAVENOUS
Refills: 0 | Status: COMPLETED | OUTPATIENT
Start: 2025-02-14

## 2025-02-18 ENCOUNTER — APPOINTMENT (OUTPATIENT)
Dept: CARDIOLOGY | Facility: CLINIC | Age: 60
End: 2025-02-18

## 2025-02-18 PROCEDURE — 93306 TTE W/DOPPLER COMPLETE: CPT

## 2025-02-19 ENCOUNTER — APPOINTMENT (OUTPATIENT)
Dept: UROLOGY | Facility: HOSPITAL | Age: 60
End: 2025-02-19

## 2025-02-21 ENCOUNTER — NON-APPOINTMENT (OUTPATIENT)
Age: 60
End: 2025-02-21

## 2025-02-26 ENCOUNTER — NON-APPOINTMENT (OUTPATIENT)
Age: 60
End: 2025-02-26

## 2025-02-27 ENCOUNTER — NON-APPOINTMENT (OUTPATIENT)
Age: 60
End: 2025-02-27

## 2025-02-28 ENCOUNTER — NON-APPOINTMENT (OUTPATIENT)
Age: 60
End: 2025-02-28

## 2025-03-13 ENCOUNTER — NON-APPOINTMENT (OUTPATIENT)
Age: 60
End: 2025-03-13

## 2025-03-14 ENCOUNTER — NON-APPOINTMENT (OUTPATIENT)
Age: 60
End: 2025-03-14

## 2025-03-18 ENCOUNTER — APPOINTMENT (OUTPATIENT)
Dept: INFECTIOUS DISEASE | Facility: CLINIC | Age: 60
End: 2025-03-18

## 2025-03-20 ENCOUNTER — APPOINTMENT (OUTPATIENT)
Dept: INFECTIOUS DISEASE | Facility: CLINIC | Age: 60
End: 2025-03-20
Payer: MEDICARE

## 2025-03-20 ENCOUNTER — NON-APPOINTMENT (OUTPATIENT)
Age: 60
End: 2025-03-20

## 2025-03-20 PROCEDURE — 99214 OFFICE O/P EST MOD 30 MIN: CPT | Mod: 2W

## 2025-03-21 ENCOUNTER — APPOINTMENT (OUTPATIENT)
Dept: CT IMAGING | Facility: CLINIC | Age: 60
End: 2025-03-21

## 2025-03-21 ENCOUNTER — OUTPATIENT (OUTPATIENT)
Dept: OUTPATIENT SERVICES | Facility: HOSPITAL | Age: 60
LOS: 1 days | End: 2025-03-21
Payer: MEDICARE

## 2025-03-21 DIAGNOSIS — N13.2 HYDRONEPHROSIS WITH RENAL AND URETERAL CALCULOUS OBSTRUCTION: ICD-10-CM

## 2025-03-21 DIAGNOSIS — Z98.890 OTHER SPECIFIED POSTPROCEDURAL STATES: Chronic | ICD-10-CM

## 2025-03-21 DIAGNOSIS — Z90.89 ACQUIRED ABSENCE OF OTHER ORGANS: Chronic | ICD-10-CM

## 2025-03-21 PROCEDURE — 74150 CT ABDOMEN W/O CONTRAST: CPT

## 2025-03-21 PROCEDURE — 74150 CT ABDOMEN W/O CONTRAST: CPT | Mod: 26

## 2025-03-31 ENCOUNTER — APPOINTMENT (OUTPATIENT)
Dept: UROLOGY | Facility: CLINIC | Age: 60
End: 2025-03-31

## 2025-04-02 ENCOUNTER — APPOINTMENT (OUTPATIENT)
Dept: INFECTIOUS DISEASE | Facility: CLINIC | Age: 60
End: 2025-04-02

## 2025-04-02 ENCOUNTER — APPOINTMENT (OUTPATIENT)
Dept: INFECTIOUS DISEASE | Facility: CLINIC | Age: 60
End: 2025-04-02
Payer: MEDICARE

## 2025-04-02 DIAGNOSIS — E56.9 VITAMIN DEFICIENCY, UNSPECIFIED: ICD-10-CM

## 2025-04-02 DIAGNOSIS — E78.5 HYPERLIPIDEMIA, UNSPECIFIED: ICD-10-CM

## 2025-04-02 DIAGNOSIS — R39.9 UNSPECIFIED SYMPTOMS AND SIGNS INVOLVING THE GENITOURINARY SYSTEM: ICD-10-CM

## 2025-04-02 DIAGNOSIS — E11.9 TYPE 2 DIABETES MELLITUS W/OUT COMPLICATIONS: ICD-10-CM

## 2025-04-02 DIAGNOSIS — R79.89 OTHER SPECIFIED ABNORMAL FINDINGS OF BLOOD CHEMISTRY: ICD-10-CM

## 2025-04-02 DIAGNOSIS — Z01.818 ENCOUNTER FOR OTHER PREPROCEDURAL EXAMINATION: ICD-10-CM

## 2025-04-02 DIAGNOSIS — B20 HUMAN IMMUNODEFICIENCY VIRUS [HIV] DISEASE: ICD-10-CM

## 2025-04-02 PROCEDURE — 99215 OFFICE O/P EST HI 40 MIN: CPT | Mod: 2W

## 2025-04-02 RX ORDER — OXYBUTYNIN CHLORIDE 5 MG/1
5 TABLET ORAL 4 TIMES DAILY
Refills: 0 | Status: ACTIVE | COMMUNITY
Start: 2025-04-02

## 2025-04-03 ENCOUNTER — NON-APPOINTMENT (OUTPATIENT)
Age: 60
End: 2025-04-03

## 2025-04-08 ENCOUNTER — APPOINTMENT (OUTPATIENT)
Dept: CARDIOLOGY | Facility: CLINIC | Age: 60
End: 2025-04-08

## 2025-04-10 ENCOUNTER — APPOINTMENT (OUTPATIENT)
Dept: INFECTIOUS DISEASE | Facility: CLINIC | Age: 60
End: 2025-04-10

## 2025-04-10 ENCOUNTER — OUTPATIENT (OUTPATIENT)
Dept: OUTPATIENT SERVICES | Facility: HOSPITAL | Age: 60
LOS: 1 days | End: 2025-04-10

## 2025-04-10 VITALS
RESPIRATION RATE: 16 BRPM | WEIGHT: 285.06 LBS | OXYGEN SATURATION: 95 % | DIASTOLIC BLOOD PRESSURE: 65 MMHG | HEART RATE: 103 BPM | HEIGHT: 73 IN | SYSTOLIC BLOOD PRESSURE: 100 MMHG | TEMPERATURE: 98 F

## 2025-04-10 DIAGNOSIS — N13.2 HYDRONEPHROSIS WITH RENAL AND URETERAL CALCULOUS OBSTRUCTION: ICD-10-CM

## 2025-04-10 DIAGNOSIS — G89.4 CHRONIC PAIN SYNDROME: ICD-10-CM

## 2025-04-10 DIAGNOSIS — G47.33 OBSTRUCTIVE SLEEP APNEA (ADULT) (PEDIATRIC): ICD-10-CM

## 2025-04-10 DIAGNOSIS — E11.9 TYPE 2 DIABETES MELLITUS WITHOUT COMPLICATIONS: ICD-10-CM

## 2025-04-10 DIAGNOSIS — Z90.89 ACQUIRED ABSENCE OF OTHER ORGANS: Chronic | ICD-10-CM

## 2025-04-10 DIAGNOSIS — I10 ESSENTIAL (PRIMARY) HYPERTENSION: ICD-10-CM

## 2025-04-10 DIAGNOSIS — Z98.890 OTHER SPECIFIED POSTPROCEDURAL STATES: Chronic | ICD-10-CM

## 2025-04-10 DIAGNOSIS — G62.9 POLYNEUROPATHY, UNSPECIFIED: ICD-10-CM

## 2025-04-10 DIAGNOSIS — Z97.8 PRESENCE OF OTHER SPECIFIED DEVICES: Chronic | ICD-10-CM

## 2025-04-10 DIAGNOSIS — N13.30 UNSPECIFIED HYDRONEPHROSIS: ICD-10-CM

## 2025-04-10 DIAGNOSIS — B20 HUMAN IMMUNODEFICIENCY VIRUS [HIV] DISEASE: ICD-10-CM

## 2025-04-10 DIAGNOSIS — R00.2 PALPITATIONS: ICD-10-CM

## 2025-04-10 LAB
A1C WITH ESTIMATED AVERAGE GLUCOSE RESULT: 10 % — HIGH (ref 4–5.6)
ANION GAP SERPL CALC-SCNC: 14 MMOL/L — SIGNIFICANT CHANGE UP (ref 7–14)
BASOPHILS # BLD AUTO: 0.04 K/UL — SIGNIFICANT CHANGE UP (ref 0–0.2)
BASOPHILS NFR BLD AUTO: 0.3 % — SIGNIFICANT CHANGE UP (ref 0–2)
BLD GP AB SCN SERPL QL: NEGATIVE — SIGNIFICANT CHANGE UP
BUN SERPL-MCNC: 24 MG/DL — HIGH (ref 7–23)
CALCIUM SERPL-MCNC: 9.4 MG/DL — SIGNIFICANT CHANGE UP (ref 8.4–10.5)
CHLORIDE SERPL-SCNC: 99 MMOL/L — SIGNIFICANT CHANGE UP (ref 98–107)
CO2 SERPL-SCNC: 24 MMOL/L — SIGNIFICANT CHANGE UP (ref 22–31)
CREAT SERPL-MCNC: 1.83 MG/DL — HIGH (ref 0.5–1.3)
EGFR: 42 ML/MIN/1.73M2 — LOW
EGFR: 42 ML/MIN/1.73M2 — LOW
EOSINOPHIL # BLD AUTO: 0.01 K/UL — SIGNIFICANT CHANGE UP (ref 0–0.5)
EOSINOPHIL NFR BLD AUTO: 0.1 % — SIGNIFICANT CHANGE UP (ref 0–6)
ESTIMATED AVERAGE GLUCOSE: 240 — SIGNIFICANT CHANGE UP
GLUCOSE SERPL-MCNC: 172 MG/DL — HIGH (ref 70–99)
HCT VFR BLD CALC: 35.1 % — LOW (ref 39–50)
HGB BLD-MCNC: 10.5 G/DL — LOW (ref 13–17)
IMM GRANULOCYTES NFR BLD AUTO: 0.8 % — SIGNIFICANT CHANGE UP (ref 0–0.9)
LYMPHOCYTES # BLD AUTO: 1.51 K/UL — SIGNIFICANT CHANGE UP (ref 1–3.3)
LYMPHOCYTES # BLD AUTO: 11.4 % — LOW (ref 13–44)
MCHC RBC-ENTMCNC: 24.4 PG — LOW (ref 27–34)
MCHC RBC-ENTMCNC: 29.9 G/DL — LOW (ref 32–36)
MCV RBC AUTO: 81.4 FL — SIGNIFICANT CHANGE UP (ref 80–100)
MONOCYTES # BLD AUTO: 1.11 K/UL — HIGH (ref 0–0.9)
MONOCYTES NFR BLD AUTO: 8.4 % — SIGNIFICANT CHANGE UP (ref 2–14)
NEUTROPHILS # BLD AUTO: 10.52 K/UL — HIGH (ref 1.8–7.4)
NEUTROPHILS NFR BLD AUTO: 79 % — HIGH (ref 43–77)
PLATELET # BLD AUTO: 394 K/UL — SIGNIFICANT CHANGE UP (ref 150–400)
POTASSIUM SERPL-MCNC: 4.4 MMOL/L — SIGNIFICANT CHANGE UP (ref 3.5–5.3)
POTASSIUM SERPL-SCNC: 4.4 MMOL/L — SIGNIFICANT CHANGE UP (ref 3.5–5.3)
RBC # BLD: 4.31 M/UL — SIGNIFICANT CHANGE UP (ref 4.2–5.8)
RBC # FLD: 14.7 % — HIGH (ref 10.3–14.5)
RH IG SCN BLD-IMP: NEGATIVE — SIGNIFICANT CHANGE UP
RH IG SCN BLD-IMP: NEGATIVE — SIGNIFICANT CHANGE UP
SODIUM SERPL-SCNC: 137 MMOL/L — SIGNIFICANT CHANGE UP (ref 135–145)
WBC # BLD: 13.29 K/UL — HIGH (ref 3.8–10.5)
WBC # FLD AUTO: 13.29 K/UL — HIGH (ref 3.8–10.5)

## 2025-04-10 RX ORDER — FERROUS SULFATE 137(45) MG
325 TABLET, EXTENDED RELEASE ORAL
Refills: 0 | DISCHARGE

## 2025-04-10 RX ORDER — PROPRANOLOL HCL 60 MG
1 TABLET ORAL
Refills: 0 | DISCHARGE

## 2025-04-10 RX ORDER — SILODOSIN 4 MG/1
1 CAPSULE ORAL
Refills: 0 | DISCHARGE

## 2025-04-10 RX ORDER — OXYBUTYNIN CHLORIDE 5 MG/1
1 TABLET, FILM COATED, EXTENDED RELEASE ORAL
Refills: 0 | DISCHARGE

## 2025-04-10 RX ORDER — ALBUTEROL SULFATE 2.5 MG/3ML
1 VIAL, NEBULIZER (ML) INHALATION
Refills: 0 | DISCHARGE

## 2025-04-10 RX ORDER — LISINOPRIL 5 MG/1
1 TABLET ORAL
Refills: 0 | DISCHARGE

## 2025-04-10 RX ORDER — BUPROPION HYDROBROMIDE 522 MG/1
2 TABLET, EXTENDED RELEASE ORAL
Refills: 0 | DISCHARGE

## 2025-04-10 RX ORDER — ONDANSETRON HCL/PF 4 MG/2 ML
1 VIAL (ML) INJECTION
Refills: 0 | DISCHARGE

## 2025-04-10 RX ORDER — FENOFIBRATE 160 MG/1
1 TABLET ORAL
Refills: 0 | DISCHARGE

## 2025-04-10 RX ORDER — ATORVASTATIN CALCIUM 80 MG/1
1 TABLET, FILM COATED ORAL
Refills: 0 | DISCHARGE

## 2025-04-10 RX ORDER — ETHACRYNATE SODIUM 50 MG/50ML
0.5 INJECTION, POWDER, LYOPHILIZED, FOR SOLUTION INTRAVENOUS
Refills: 0 | DISCHARGE

## 2025-04-10 RX ORDER — LAMOTRIGINE 150 MG/1
1 TABLET ORAL
Refills: 0 | DISCHARGE

## 2025-04-10 NOTE — H&P PST ADULT - LAST STRESS TEST
2/2025 ([facility]) Normal myocardial perfusion scan, with no evidence of infarction or inducible ischemia

## 2025-04-10 NOTE — H&P PST ADULT - CARDIOVASCULAR COMMENTS
had a recent cardiology visit, negative stress test. chronic diastolic HF with preserved EF - no hospitalizations or exacerbations. PAF (not on AC due to hematuria) - MCOT 5/2024 with 1% burden PAF/flutter

## 2025-04-10 NOTE — H&P PST ADULT - PROBLEM SELECTOR PLAN 3
Patient instructed to take methadone with a sip of water on the morning of procedure.    Pain service notified via email  - patient is on methadone and oxycodone

## 2025-04-10 NOTE — H&P PST ADULT - ENDOCRINE COMMENTS
uncontrolled Diabetes A1C 11.5 - no medication change after that A1C results, he does not check FS at home.

## 2025-04-10 NOTE — H&P PST ADULT - IN ACCORDANCE WITH [place] STATE LAW, WE OFFER EVERY PATIENT A HEPATITIS C TEST. WOULD YOU LIKE TO BE TESTED TODAY?
I met with the patient in the treatment room.  We discussed the plan of care and discontinuation of chemotherapy but finishing radiation due to side effects; encouraged him and emotional support provided.  Patient admits to having nausea and he has the medications at home to take that he will continue.  Emotional support provided to his wife and encouraged them to call me if any needs arise.   
Opt out

## 2025-04-10 NOTE — H&P PST ADULT - PROBLEM SELECTOR PLAN 4
last dose Ozempic was 4/5/25.    Patient instructed to hold Tradjenta on the morning of procedure. Pt stated understanding.

## 2025-04-10 NOTE — H&P PST ADULT - LAST ECHOCARDIOGRAM
2/2025 ([facility]) Mild left ventricular hypertrophy. Left ventricular systolic function is normal, EF 60-65%  Normal left ventricular diastolic function

## 2025-04-10 NOTE — H&P PST ADULT - PROBLEM SELECTOR PLAN 1
Patient tentatively scheduled for left laparoscopic possible open nephrectomy for 4/17/25. Pre-op instructions provided. Pt given verbal and written instructions with teach back on chlorhexidine shampoo. Pt will take own esomeprazole on the morning of procedure for GI prophylaxis.  Pt verbalized understanding with return demonstration.     PST CBC T&S ABO BMP A1C done    Cardiology visit on 2/13/25. As per cardiology "as long as no high risk findings on testing, will be acceptable CV risk for surgery. Echo and stress test results in [facility].

## 2025-04-10 NOTE — H&P PST ADULT - GENERAL GENITOURINARY SYMPTOMS
Pt states atkinson was changed by PCP on Monday 4/7/25, UA and culture sent. Denies dysuria/hematuria

## 2025-04-10 NOTE — H&P PST ADULT - NSICDXPASTMEDICALHX_GEN_ALL_CORE_FT
PAST MEDICAL HISTORY:  Anxiety and depression     Atrophic kidney     Bipolar disorder     Chronic diastolic heart failure with preserved ejection fraction     Chronic pain syndrome     Diabetic nephropathy     DM2 (diabetes mellitus, type 2)     History of anemia     History of BPH     HIV (human immunodeficiency virus infection)     HLD (hyperlipidemia)     HTN (hypertension)     Hydronephrosis, left     Morbid obesity     Neuropathy     PAF (paroxysmal atrial fibrillation)     PNA (pneumonia)     Sleep apnea

## 2025-04-10 NOTE — H&P PST ADULT - HISTORY OF PRESENT ILLNESS
59 year old  male PMH atrial flutter s/p ablation in 2018 with Dr Vee, chronic HFpEF, PAF (not on AC due to h/o hematuria), DM type 2 (uncontrolled A1C 11.5, on ozempic), HTN, HLD, HIV with neuropathy (viral load undetectable), CKD stage 3 (Cr 1.6-1.8) from diabetic nephropathy, renal calculi, atrophic left kidney, MESSI unable to tolerate CPAP (uses 2.5L NC at bedtime and as needed), chronic pain syndrome on methadone, morbid obesity, anxiety/depression/bipolar disorder presents to presurgical testing with diagnosis of hydronephrosis with renal and ureteral calculus obstruction. Pt with atrophic left kidney, recurrent UTIs, and hydronephrosis. Pt has an indwelling urinary catheter. Pt is scheduled for a left laparoscopic possible open nephrectomy.    Procedure was rescheduled from 2/19/25 ([facility]). Pt reports it was due to uncontrolled diabetes. A1C was 11.5. Pt states PCP manages his diabetes and his medications have not changed since his last PST visit. Pt is compliant with Ozempic and Tradjenta. He does not check his FS at home.     Pt states he was referred to cardiology by [facility] PST due to palpitations. He completed echo and stress test in 2/2025. Results available in HIE. Cardiology visit on 2/13/25. As per cardiology "as long as no high risk findings on testing, will be acceptable CV risk for surgery.        59 year old  male PMH atrial flutter s/p ablation in 2018 with Dr Vee,  PAF (not on AC due to h/o hematuria), chronic HFpEF, DM type 2 (uncontrolled A1C 11.5, on ozempic), HTN, HLD, HIV with neuropathy (viral load undetectable), CKD stage 3 (Cr 1.6-1.8) from diabetic nephropathy, MESSI unable to tolerate CPAP (uses 2.5L NC at bedtime and as needed), chronic pain syndrome on methadone, morbid obesity, anxiety/depression/bipolar disorder presents to presurgical testing with diagnosis of hydronephrosis with renal and ureteral calculus obstruction. Pt with atrophic left kidney, recurrent UTIs, and hydronephrosis. Pt has an indwelling urinary catheter. Pt is scheduled for a left laparoscopic possible open nephrectomy.    Procedure was rescheduled from 2/19/25 ([facility]). Pt reports it was due to uncontrolled diabetes. A1C was 11.5. Pt states PCP manages his diabetes and his medications have not changed since his last PST visit. Pt is compliant with Ozempic and Tradjenta. He does not check his FS at home.     Pt states he was referred to cardiology by [facility] PST due to palpitations. He completed echo and stress test in 2/2025. Results available in HIE. Cardiology visit on 2/13/25. As per cardiology "as long as no high risk findings on testing, will be acceptable CV risk for surgery.

## 2025-04-10 NOTE — H&P PST ADULT - MUSCULOSKELETAL COMMENTS
Chronic pain on methadone and oxycodone using motorized scooter, can walk short distance from scooter to examining table

## 2025-04-10 NOTE — H&P PST ADULT - NSICDXPASTSURGICALHX_GEN_ALL_CORE_FT
PAST SURGICAL HISTORY:  Peck catheter present     History of adenoidectomy     History of cardiac radiofrequency ablation

## 2025-04-11 PROBLEM — E11.9 TYPE 2 DIABETES MELLITUS WITHOUT COMPLICATIONS: Chronic | Status: INACTIVE | Noted: 2017-12-13 | Resolved: 2025-04-10

## 2025-04-11 RX ORDER — IRON DEXTRAN COMPLEX 100 MG/2ML
975 VIAL (ML) INJECTION
Qty: 10 | Refills: 0
Start: 2025-04-11

## 2025-04-11 RX ORDER — FERRIC DERISOMALTOSE 1000 MG/10ML
1000 SOLUTION INTRAVENOUS
Qty: 1 | Refills: 0
Start: 2025-04-11

## 2025-04-11 RX ORDER — IRON DEXTRAN COMPLEX 100 MG/2ML
25 VIAL (ML) INJECTION
Qty: 1 | Refills: 0
Start: 2025-04-11

## 2025-04-11 NOTE — PROGRESS NOTE ADULT - SUBJECTIVE AND OBJECTIVE BOX
Provider Specialty: Anesthesiology    Reason for referral/consultation:  Anemia    Subjective/Objective:    This is a  59y y.o patient, who is scheduled for a Left nephrectomy with Dr. Salcido on _4/17/25_.      During the preoperative evaluation, the patient was diagnosed with iron deficiency anemia.  The patient was contacted by phone.  The diagnosis and treatment were explained in detail, including risks and benefits.  Questions were encouraged and answered.  The patient is agreeable with the plan.    Health Issues:  Hydronephrosis with renal and ureteral calculus obstruction    HIV (human immunodeficiency virus infection)    PNA (pneumonia)    Neuropathy    Sleep apnea    DM2 (diabetes mellitus, type 2)    Chronic pain syndrome    Diabetic nephropathy    History of anemia    Chronic diastolic heart failure with preserved ejection fraction    HTN (hypertension)    HLD (hyperlipidemia)    Atrophic kidney    Hydronephrosis, left    Morbid obesity    History of BPH    PAF (paroxysmal atrial fibrillation)    Bipolar disorder    Anxiety and depression    Hydronephrosis, left    HTN (hypertension)    Chronic pain syndrome    DM2 (diabetes mellitus, type 2)    HIV disease    MESSI (obstructive sleep apnea)    Palpitations    Bipolar depression    Neuropathy        Medications:      Allergies:  sulfa drugs (Other)  Bactrim (Anaphylaxis; Other)  Niaspan ER (Unknown)      Labs:  Hb 10.5 g/dL  Hct 35.1 %  Ferritin 353 ng/mL  Ferritin, Serum --  BUN --  CRT --      Assessment:      Iron Deficiency Anemia.  Preoperative anemia has been shown to be an independent risk factor for perioperative morbidity and mortality. This risk is further exacerbated by surgical blood loss and is not mitigated by allogeneic transfusion and the presence of anemia should be evaluated in all surgical patients, especially in those where moderate blood loss is anticipated.    Recommendation:      IV iron 1000 mg x 1 dose.  IV iron is safe and efficacious and should be used as front-line therapy in patients in whom surgery is planned for less than 6 weeks after the diagnosis of anemia, who do not respond to oral iron or are not able to tolerate it.  Suggest repeat lab work 2-4 weeks after iron infusion.

## 2025-04-14 ENCOUNTER — APPOINTMENT (OUTPATIENT)
Dept: CARDIOLOGY | Facility: CLINIC | Age: 60
End: 2025-04-14
Payer: MEDICARE

## 2025-04-14 ENCOUNTER — NON-APPOINTMENT (OUTPATIENT)
Age: 60
End: 2025-04-14

## 2025-04-14 VITALS
SYSTOLIC BLOOD PRESSURE: 114 MMHG | HEIGHT: 73 IN | HEART RATE: 121 BPM | DIASTOLIC BLOOD PRESSURE: 90 MMHG | BODY MASS INDEX: 36.25 KG/M2 | WEIGHT: 273.5 LBS

## 2025-04-14 DIAGNOSIS — Z01.810 ENCOUNTER FOR PREPROCEDURAL CARDIOVASCULAR EXAMINATION: ICD-10-CM

## 2025-04-14 DIAGNOSIS — E11.9 TYPE 2 DIABETES MELLITUS W/OUT COMPLICATIONS: ICD-10-CM

## 2025-04-14 DIAGNOSIS — I48.92 UNSPECIFIED ATRIAL FLUTTER: ICD-10-CM

## 2025-04-14 DIAGNOSIS — I50.32 CHRONIC DIASTOLIC (CONGESTIVE) HEART FAILURE: ICD-10-CM

## 2025-04-14 DIAGNOSIS — I10 ESSENTIAL (PRIMARY) HYPERTENSION: ICD-10-CM

## 2025-04-14 DIAGNOSIS — E78.5 HYPERLIPIDEMIA, UNSPECIFIED: ICD-10-CM

## 2025-04-14 PROBLEM — E11.21 TYPE 2 DIABETES MELLITUS WITH DIABETIC NEPHROPATHY: Chronic | Status: ACTIVE | Noted: 2025-04-10

## 2025-04-14 PROBLEM — Z87.438 PERSONAL HISTORY OF OTHER DISEASES OF MALE GENITAL ORGANS: Chronic | Status: ACTIVE | Noted: 2025-04-10

## 2025-04-14 PROBLEM — G89.4 CHRONIC PAIN SYNDROME: Chronic | Status: ACTIVE | Noted: 2025-04-10

## 2025-04-14 PROBLEM — N13.30 UNSPECIFIED HYDRONEPHROSIS: Chronic | Status: ACTIVE | Noted: 2025-04-10

## 2025-04-14 PROBLEM — I48.0 PAROXYSMAL ATRIAL FIBRILLATION: Chronic | Status: ACTIVE | Noted: 2025-04-10

## 2025-04-14 PROBLEM — Z86.2 PERSONAL HISTORY OF DISEASES OF THE BLOOD AND BLOOD-FORMING ORGANS AND CERTAIN DISORDERS INVOLVING THE IMMUNE MECHANISM: Chronic | Status: ACTIVE | Noted: 2025-04-10

## 2025-04-14 PROBLEM — N26.1 ATROPHY OF KIDNEY (TERMINAL): Chronic | Status: ACTIVE | Noted: 2025-04-10

## 2025-04-14 PROBLEM — E66.01 MORBID (SEVERE) OBESITY DUE TO EXCESS CALORIES: Chronic | Status: ACTIVE | Noted: 2025-04-10

## 2025-04-14 PROCEDURE — 93000 ELECTROCARDIOGRAM COMPLETE: CPT

## 2025-04-14 PROCEDURE — G2211 COMPLEX E/M VISIT ADD ON: CPT

## 2025-04-14 PROCEDURE — 99214 OFFICE O/P EST MOD 30 MIN: CPT

## 2025-04-14 RX ORDER — METOPROLOL TARTRATE 25 MG/1
25 TABLET ORAL TWICE DAILY
Qty: 180 | Refills: 2 | Status: ACTIVE | COMMUNITY
Start: 2025-04-14 | End: 1900-01-01

## 2025-04-16 ENCOUNTER — APPOINTMENT (OUTPATIENT)
Dept: INFECTIOUS DISEASE | Facility: CLINIC | Age: 60
End: 2025-04-16

## 2025-04-17 ENCOUNTER — APPOINTMENT (OUTPATIENT)
Dept: UROLOGY | Facility: HOSPITAL | Age: 60
End: 2025-04-17

## 2025-05-06 ENCOUNTER — NON-APPOINTMENT (OUTPATIENT)
Age: 60
End: 2025-05-06

## 2025-05-08 ENCOUNTER — APPOINTMENT (OUTPATIENT)
Dept: INFECTIOUS DISEASE | Facility: CLINIC | Age: 60
End: 2025-05-08

## 2025-05-15 ENCOUNTER — APPOINTMENT (OUTPATIENT)
Dept: INFECTIOUS DISEASE | Facility: CLINIC | Age: 60
End: 2025-05-15
Payer: MEDICARE

## 2025-05-15 PROCEDURE — 99214 OFFICE O/P EST MOD 30 MIN: CPT | Mod: 2W

## 2025-05-19 ENCOUNTER — NON-APPOINTMENT (OUTPATIENT)
Age: 60
End: 2025-05-19

## 2025-06-05 ENCOUNTER — APPOINTMENT (OUTPATIENT)
Dept: INFECTIOUS DISEASE | Facility: CLINIC | Age: 60
End: 2025-06-05

## 2025-06-05 PROCEDURE — 99213 OFFICE O/P EST LOW 20 MIN: CPT | Mod: 2W

## 2025-07-14 ENCOUNTER — APPOINTMENT (OUTPATIENT)
Dept: CARDIOLOGY | Facility: CLINIC | Age: 60
End: 2025-07-14

## 2025-07-17 ENCOUNTER — APPOINTMENT (OUTPATIENT)
Dept: INFECTIOUS DISEASE | Facility: CLINIC | Age: 60
End: 2025-07-17

## 2025-07-17 PROCEDURE — 99213 OFFICE O/P EST LOW 20 MIN: CPT | Mod: 2W

## 2025-07-22 ENCOUNTER — NON-APPOINTMENT (OUTPATIENT)
Age: 60
End: 2025-07-22

## 2025-07-31 ENCOUNTER — NON-APPOINTMENT (OUTPATIENT)
Age: 60
End: 2025-07-31

## 2025-08-06 ENCOUNTER — NON-APPOINTMENT (OUTPATIENT)
Age: 60
End: 2025-08-06

## 2025-08-07 ENCOUNTER — APPOINTMENT (OUTPATIENT)
Dept: INFECTIOUS DISEASE | Facility: CLINIC | Age: 60
End: 2025-08-07

## 2025-08-07 PROCEDURE — 99214 OFFICE O/P EST MOD 30 MIN: CPT | Mod: 2W

## 2025-09-03 ENCOUNTER — APPOINTMENT (OUTPATIENT)
Dept: PSYCHIATRY | Facility: HOSPITAL | Age: 60
End: 2025-09-03

## 2025-09-03 ENCOUNTER — RX RENEWAL (OUTPATIENT)
Age: 60
End: 2025-09-03

## 2025-09-04 ENCOUNTER — APPOINTMENT (OUTPATIENT)
Dept: INFECTIOUS DISEASE | Facility: CLINIC | Age: 60
End: 2025-09-04

## 2025-09-04 PROCEDURE — 99214 OFFICE O/P EST MOD 30 MIN: CPT | Mod: 2W

## 2025-09-04 RX ORDER — MIRTAZAPINE 7.5 MG/1
7.5 TABLET, FILM COATED ORAL
Qty: 30 | Refills: 3 | Status: ACTIVE | COMMUNITY
Start: 2025-09-04 | End: 1900-01-01